# Patient Record
Sex: FEMALE | Race: WHITE | NOT HISPANIC OR LATINO | Employment: FULL TIME
[De-identification: names, ages, dates, MRNs, and addresses within clinical notes are randomized per-mention and may not be internally consistent; named-entity substitution may affect disease eponyms.]

---

## 2017-10-22 ENCOUNTER — HEALTH MAINTENANCE LETTER (OUTPATIENT)
Age: 42
End: 2017-10-22

## 2020-01-29 ENCOUNTER — HOSPITAL ENCOUNTER (EMERGENCY)
Facility: CLINIC | Age: 45
Discharge: HOME | End: 2020-01-29
Attending: FAMILY MEDICINE | Admitting: FAMILY MEDICINE
Payer: COMMERCIAL

## 2020-01-29 VITALS — RESPIRATION RATE: 17 BRPM | TEMPERATURE: 98.3 F | SYSTOLIC BLOOD PRESSURE: 119 MMHG | DIASTOLIC BLOOD PRESSURE: 79 MMHG

## 2020-01-29 DIAGNOSIS — M54.2 TRIGGER POINT WITH NECK PAIN: ICD-10-CM

## 2020-01-29 PROCEDURE — 99284 EMERGENCY DEPT VISIT MOD MDM: CPT | Mod: 25 | Performed by: FAMILY MEDICINE

## 2020-01-29 PROCEDURE — 99283 EMERGENCY DEPT VISIT LOW MDM: CPT | Mod: 25 | Performed by: FAMILY MEDICINE

## 2020-01-29 PROCEDURE — 20552 NJX 1/MLT TRIGGER POINT 1/2: CPT | Mod: Z6 | Performed by: FAMILY MEDICINE

## 2020-01-29 PROCEDURE — 20552 NJX 1/MLT TRIGGER POINT 1/2: CPT | Performed by: FAMILY MEDICINE

## 2020-01-29 RX ORDER — MELOXICAM 15 MG/1
7.5 TABLET ORAL DAILY
Qty: 5 TABLET | Refills: 0 | Status: SHIPPED | OUTPATIENT
Start: 2020-01-29 | End: 2020-01-29

## 2020-01-29 RX ORDER — BUPIVACAINE HYDROCHLORIDE 5 MG/ML
3 INJECTION, SOLUTION PERINEURAL ONCE
Status: COMPLETED | OUTPATIENT
Start: 2020-01-29 | End: 2020-01-29

## 2020-01-29 RX ORDER — MELOXICAM 15 MG/1
7.5 TABLET ORAL DAILY
Qty: 5 TABLET | Refills: 0 | Status: ON HOLD | OUTPATIENT
Start: 2020-01-29 | End: 2021-08-25

## 2020-01-29 RX ADMIN — BUPIVACAINE HYDROCHLORIDE 15 MG: 5 INJECTION, SOLUTION PERINEURAL at 03:23

## 2020-01-29 ASSESSMENT — ENCOUNTER SYMPTOMS
POLYDIPSIA: 0
PSYCHIATRIC NEGATIVE: 1
CARDIOVASCULAR NEGATIVE: 1
EYES NEGATIVE: 1
NUMBNESS: 0
MYALGIAS: 1
BACK PAIN: 1
WEAKNESS: 0
COUGH: 0
NECK PAIN: 1
CHILLS: 0
HEADACHES: 0
SHORTNESS OF BREATH: 0
FEVER: 0
GASTROINTESTINAL NEGATIVE: 1
POLYPHAGIA: 0

## 2020-01-29 NOTE — ED NOTES
No change in the pain since the trigger point injections.  Waiting for medications from pharmacy at this time for discharge.

## 2020-01-29 NOTE — ED AVS SNAPSHOT
[facility] Emergency Department  [address] [place] [address]  Phone:  [phone]  Fax:  [phone]                                    Lizz Polanco   MRN: [id number]    Department:  [facility] Emergency Department   Date of Visit:  1/29/2020           After Visit Summary Signature Page    I have received my discharge instructions, and my questions have been answered. I have discussed any challenges I see with this plan with the nurse or doctor.    ..........................................................................................................................................  Patient/Patient Representative Signature      ..........................................................................................................................................  Patient Representative Print Name and Relationship to Patient    ..................................................               ................................................  Date                                   Time    ..........................................................................................................................................  Reviewed by Signature/Title    ...................................................              ..............................................  Date                                               Time          22EPIC Rev 08/18

## 2020-01-29 NOTE — ED PROVIDER NOTES
History     Chief Complaint   Patient presents with     Neck Pain     HPI  Lizz Polanco is a 44 year old female who presents to the emergency room secondary concerns of neck pain to the right upper neck and posterior shoulder region.  States that the symptoms started about 10 days ago.  She is not aware of any specific injury.  She thinks that she might have a pinched nerve in her neck as a reason for the symptoms.  She denies any weakness in her upper extremities or lower extremities.  Denies any numbness or tingling except when pushing on a tender spot to the right shoulder area she states that she gets some tingling down the back of her upper arm to the right elbow.  Patient denies any fever or chills symptoms.  She denies any incontinence of bowel or bladder.  Patient states that she has an appointment with her clinic physician later this morning but just is unable to sleep and is looking for some control of the pain symptoms to allow her to get some sleep tonight before she can be seen in her clinic later today.  Patient drove herself to the ER.    Allergies:  No Known Allergies    Problem List:    There are no active problems to display for this patient.       Past Medical History:    No past medical history on file.    Past Surgical History:    Past Surgical History:   Procedure Laterality Date     BREAST SURGERY       ENT SURGERY         Family History:    No family history on file.    Social History:  Marital Status:   [2]  Social History     Tobacco Use     Smoking status: Former Smoker   Substance Use Topics     Alcohol use: No     Drug use: No        Medications:    meloxicam (MOBIC) 15 MG tablet  tiZANidine (ZANAFLEX) 4 MG tablet  Zolpidem Tartrate (AMBIEN PO)      Review of Systems   Constitutional: Negative for chills and fever.   HENT: Negative.    Eyes: Negative.    Respiratory: Negative for cough and shortness of breath.    Cardiovascular: Negative.    Gastrointestinal: Negative.     Endocrine: Negative for polydipsia, polyphagia and polyuria.   Genitourinary: Negative.    Musculoskeletal: Positive for back pain, myalgias and neck pain. Negative for gait problem.   Skin: Negative.  Negative for rash.   Neurological: Negative for weakness, numbness and headaches.   Psychiatric/Behavioral: Negative.    All other systems reviewed and are negative.      Physical Exam   BP: 119/79  Heart Rate: 96  Temp: 98.3  F (36.8  C)  Resp: 17      Physical Exam  Vitals signs and nursing note reviewed.   Constitutional:       General: She is in acute distress.      Appearance: Normal appearance. She is not ill-appearing, toxic-appearing or diaphoretic.   HENT:      Head: Normocephalic and atraumatic.      Nose: Nose normal.      Mouth/Throat:      Mouth: Mucous membranes are moist.   Eyes:      Extraocular Movements: Extraocular movements intact.      Conjunctiva/sclera: Conjunctivae normal.      Pupils: Pupils are equal, round, and reactive to light.   Neck:      Musculoskeletal: Muscular tenderness present. No injury or spinous process tenderness.        Comments: Compression test of the cervical spine did not elicit any radicular symptomatology.  No spinous process tenderness noted.  She does have the muscle tenderness as described above.  No acute neck injury identified.  Neurological:      Mental Status: She is alert.         ED Course        Procedures         Procedure: 3ml of 0.5% Marcaine was divided equally among the 3 trigger points to the right upper trapezius musculature.. Cleansing of the skin was performed with alcohol. Lizz was given informed consent as to the possible side effects of the injection to include: allergic reaction, infection, and possible puncture of lung. Lizz agrees to proceed with the trigger point injection(s). A timeout was observed prior to injecting the trigger points. The trigger points areas were injected without complication. Lizz was watched for signs of allergic  reaction and none were noted. Lizz was instructed to ice the trigger point areas and continue the gentle stretching exercises. She is encouraged to watch for evidence of infection at the trigger point injection site(s) and return to the ER or her clinic should infection be suspected.         Critical Care time:  none                 Medications   BUPivacaine (MARCAINE) 0.5% injection (15 mg Other Given by Other Clinician 1/29/20 0323)       Assessments & Plan (with Medical Decision Making)  44-year-old female to the ER secondary concerns of right-sided neck pain which he is concerned may be a pinched nerve in her neck.  She has appointment scheduled later today with her clinic physician in [place], [place].  Patient is desiring some help with the pain so she can get some sleep this morning.  The patient with exam findings consistent with myofascial tender points and trigger points in the right trapezius musculature.  Patient was offered trigger point injections and this was accepted.  Injections performed as per the procedure note above.  Patient tolerated well.  Patient instructed in the use of ice therapy followed by gentle stretching and/or massage therapy to the area.  Encourage follow-up with her clinic physician later today for recheck.  Additional medication prescribed as listed below.     I have reviewed the nursing notes.    I have reviewed the findings, diagnosis, plan and need for follow up with the patient.       Discharge Medication List as of 1/29/2020  3:07 AM      START taking these medications    Details   tiZANidine (ZANAFLEX) 4 MG tablet Take 1 tablet (4 mg) by mouth 3 times daily as needed for muscle spasms (MUSCLE SPASM), Disp-10 tablet, R-0, E-Prescribe                  I verbally discussed the findings of the evaluation today in the ER. I have verbally discussed with Lizz the suggested treatment(s) as described in the discharge instructions and handouts. I have prescribed the above  listed medications and instructed her on appropriate use of these medications.      I have verbally suggested she follow-up in her clinic or return to the ER for increased symptoms. See the follow-up recommendations documented  in the after visit summary in this visit's EPIC chart.    Final diagnoses:   Trigger point with neck pain - right trapezius x 3       1/29/2020   [facility] EMERGENCY DEPARTMENT     Saulo Greenfield,   01/29/20 0631

## 2020-01-29 NOTE — ED TRIAGE NOTES
Pt presents with concerns of right sided neck pain that started about 10 days ago.  Pt states pain is into her shoulder, denies tingling or numbness into the extremities. Pt states that she has had a pinched nerve in that location previous, has an appointment with Dave in the morning for this issue.  Tylenol last at 1900.  Pt tried Lidocaine patches.  Pt states that her goal for tonight's visit is something so she can sleep.

## 2020-01-29 NOTE — DISCHARGE INSTRUCTIONS
Please read and follow the handout(s) instructions. Return, if needed, for increased or worsening symptoms and as directed by the handout(s).    It is important for you to ice the area of pain/spasm. Use the ice for 10-15 minutes every 1-2 hours as needed for the pain. Gently stretch the area after the ice while the area is still cold (see the stretching handout). Swim or walk daily. This will help prevent the muscle from weakening which will eventually cause more risk of spasm/pain. Take your medications as directed only (see the med list). Return, if needed, for increased symptoms as directed your handout(s).     Saulo Greenfield DO

## 2020-02-24 ENCOUNTER — HEALTH MAINTENANCE LETTER (OUTPATIENT)
Age: 45
End: 2020-02-24

## 2020-12-13 ENCOUNTER — HEALTH MAINTENANCE LETTER (OUTPATIENT)
Age: 45
End: 2020-12-13

## 2021-02-21 ENCOUNTER — HEALTH MAINTENANCE LETTER (OUTPATIENT)
Age: 46
End: 2021-02-21

## 2021-04-17 ENCOUNTER — HEALTH MAINTENANCE LETTER (OUTPATIENT)
Age: 46
End: 2021-04-17

## 2021-08-25 ENCOUNTER — ANESTHESIA (OUTPATIENT)
Dept: EMERGENCY MEDICINE | Facility: CLINIC | Age: 46
End: 2021-08-25
Payer: COMMERCIAL

## 2021-08-25 ENCOUNTER — HOSPITAL ENCOUNTER (INPATIENT)
Facility: CLINIC | Age: 46
LOS: 4 days | Discharge: HOME | End: 2021-08-29
Attending: SURGERY | Admitting: SURGERY
Payer: COMMERCIAL

## 2021-08-25 ENCOUNTER — APPOINTMENT (OUTPATIENT)
Dept: GENERAL RADIOLOGY | Facility: CLINIC | Age: 46
End: 2021-08-25
Attending: EMERGENCY MEDICINE
Payer: COMMERCIAL

## 2021-08-25 ENCOUNTER — ANESTHESIA EVENT (OUTPATIENT)
Dept: EMERGENCY MEDICINE | Facility: CLINIC | Age: 46
End: 2021-08-25
Payer: COMMERCIAL

## 2021-08-25 ENCOUNTER — HOSPITAL ENCOUNTER (EMERGENCY)
Facility: CLINIC | Age: 46
Discharge: ANOTHER HOSPITAL | End: 2021-08-25
Attending: EMERGENCY MEDICINE | Admitting: EMERGENCY MEDICINE
Payer: COMMERCIAL

## 2021-08-25 ENCOUNTER — APPOINTMENT (OUTPATIENT)
Dept: GENERAL RADIOLOGY | Facility: CLINIC | Age: 46
End: 2021-08-25
Attending: PHYSICIAN ASSISTANT
Payer: COMMERCIAL

## 2021-08-25 VITALS
TEMPERATURE: 99.3 F | HEART RATE: 82 BPM | BODY MASS INDEX: 26.72 KG/M2 | SYSTOLIC BLOOD PRESSURE: 94 MMHG | RESPIRATION RATE: 24 BRPM | OXYGEN SATURATION: 98 % | DIASTOLIC BLOOD PRESSURE: 69 MMHG | WEIGHT: 127.87 LBS

## 2021-08-25 DIAGNOSIS — T50.902A OVERDOSE, INTENTIONAL SELF-HARM, INITIAL ENCOUNTER (H): ICD-10-CM

## 2021-08-25 DIAGNOSIS — R45.851 SUICIDAL IDEATION: ICD-10-CM

## 2021-08-25 DIAGNOSIS — T50.902A INTENTIONAL DRUG OVERDOSE, INITIAL ENCOUNTER (H): Primary | ICD-10-CM

## 2021-08-25 PROBLEM — T50.901A OVERDOSE: Status: ACTIVE | Noted: 2021-08-25

## 2021-08-25 LAB
ALBUMIN SERPL-MCNC: 3.2 G/DL (ref 3.4–5)
ALBUMIN SERPL-MCNC: 3.9 G/DL (ref 3.4–5)
ALBUMIN UR-MCNC: 100 MG/DL
ALP SERPL-CCNC: 55 U/L (ref 40–150)
ALP SERPL-CCNC: 62 U/L (ref 40–150)
ALT SERPL W P-5'-P-CCNC: 14 U/L (ref 0–50)
ALT SERPL W P-5'-P-CCNC: 14 U/L (ref 0–50)
AMPHETAMINES UR QL: NOT DETECTED
ANION GAP SERPL CALCULATED.3IONS-SCNC: 3 MMOL/L (ref 3–14)
ANION GAP SERPL CALCULATED.3IONS-SCNC: 5 MMOL/L (ref 3–14)
APAP SERPL-MCNC: <2 MG/L (ref 10–30)
APPEARANCE UR: ABNORMAL
AST SERPL W P-5'-P-CCNC: 14 U/L (ref 0–45)
AST SERPL W P-5'-P-CCNC: 16 U/L (ref 0–45)
BARBITURATES UR QL SCN: NOT DETECTED
BASE EXCESS BLDV CALC-SCNC: 3.4 MMOL/L (ref -7.7–1.9)
BASOPHILS # BLD AUTO: 0.1 10E3/UL (ref 0–0.2)
BASOPHILS NFR BLD AUTO: 1 %
BENZODIAZ UR QL SCN: DETECTED
BILIRUB SERPL-MCNC: 0.6 MG/DL (ref 0.2–1.3)
BILIRUB SERPL-MCNC: 0.6 MG/DL (ref 0.2–1.3)
BILIRUB UR QL STRIP: NEGATIVE
BUN SERPL-MCNC: 8 MG/DL (ref 7–30)
BUN SERPL-MCNC: 8 MG/DL (ref 7–30)
BUPRENORPHINE UR QL: NOT DETECTED
CA-I BLD-MCNC: 4.3 MG/DL (ref 4.4–5.2)
CALCIUM SERPL-MCNC: 7.6 MG/DL (ref 8.5–10.1)
CALCIUM SERPL-MCNC: 8.7 MG/DL (ref 8.5–10.1)
CANNABINOIDS UR QL: DETECTED
CHLORIDE BLD-SCNC: 103 MMOL/L (ref 94–109)
CHLORIDE BLD-SCNC: 113 MMOL/L (ref 94–109)
CO2 SERPL-SCNC: 26 MMOL/L (ref 20–32)
CO2 SERPL-SCNC: 28 MMOL/L (ref 20–32)
COCAINE UR QL SCN: NOT DETECTED
COLOR UR AUTO: YELLOW
CREAT SERPL-MCNC: 0.7 MG/DL (ref 0.52–1.04)
CREAT SERPL-MCNC: 0.72 MG/DL (ref 0.52–1.04)
D-METHAMPHET UR QL: NOT DETECTED
EOSINOPHIL # BLD AUTO: 0.1 10E3/UL (ref 0–0.7)
EOSINOPHIL NFR BLD AUTO: 1 %
ERYTHROCYTE [DISTWIDTH] IN BLOOD BY AUTOMATED COUNT: 11.8 % (ref 10–15)
ERYTHROCYTE [DISTWIDTH] IN BLOOD BY AUTOMATED COUNT: 11.9 % (ref 10–15)
ETHANOL SERPL-MCNC: <0.01 G/DL
GFR SERPL CREATININE-BSD FRML MDRD: >90 ML/MIN/1.73M2
GFR SERPL CREATININE-BSD FRML MDRD: >90 ML/MIN/1.73M2
GLUCOSE BLD-MCNC: 232 MG/DL (ref 70–99)
GLUCOSE BLD-MCNC: 80 MG/DL (ref 70–99)
GLUCOSE BLDC GLUCOMTR-MCNC: 108 MG/DL (ref 70–99)
GLUCOSE BLDC GLUCOMTR-MCNC: 84 MG/DL (ref 70–99)
GLUCOSE UR STRIP-MCNC: 50 MG/DL
HBA1C MFR BLD: 5.3 % (ref 0–5.6)
HCG UR QL: NEGATIVE
HCO3 BLDV-SCNC: 29 MMOL/L (ref 21–28)
HCT VFR BLD AUTO: 37.1 % (ref 35–47)
HCT VFR BLD AUTO: 39.3 % (ref 35–47)
HGB BLD-MCNC: 12.5 G/DL (ref 11.7–15.7)
HGB BLD-MCNC: 13.7 G/DL (ref 11.7–15.7)
HGB UR QL STRIP: NEGATIVE
HYALINE CASTS: 5 /LPF
IMM GRANULOCYTES # BLD: 0 10E3/UL
IMM GRANULOCYTES NFR BLD: 0 %
INR PPP: 1.08 (ref 0.85–1.15)
KETONES UR STRIP-MCNC: NEGATIVE MG/DL
LACTATE SERPL-SCNC: 1.1 MMOL/L (ref 0.7–2)
LACTATE SERPL-SCNC: 1.4 MMOL/L (ref 0.7–2)
LEUKOCYTE ESTERASE UR QL STRIP: NEGATIVE
LYMPHOCYTES # BLD AUTO: 1.1 10E3/UL (ref 0.8–5.3)
LYMPHOCYTES NFR BLD AUTO: 12 %
MAGNESIUM SERPL-MCNC: 2.1 MG/DL (ref 1.6–2.3)
MCH RBC QN AUTO: 31 PG (ref 26.5–33)
MCH RBC QN AUTO: 31.6 PG (ref 26.5–33)
MCHC RBC AUTO-ENTMCNC: 33.7 G/DL (ref 31.5–36.5)
MCHC RBC AUTO-ENTMCNC: 34.9 G/DL (ref 31.5–36.5)
MCV RBC AUTO: 91 FL (ref 78–100)
MCV RBC AUTO: 92 FL (ref 78–100)
METHADONE UR QL SCN: NOT DETECTED
MONOCYTES # BLD AUTO: 0.6 10E3/UL (ref 0–1.3)
MONOCYTES NFR BLD AUTO: 6 %
MUCOUS THREADS #/AREA URNS LPF: PRESENT /LPF
NEUTROPHILS # BLD AUTO: 7.3 10E3/UL (ref 1.6–8.3)
NEUTROPHILS NFR BLD AUTO: 80 %
NITRATE UR QL: NEGATIVE
NRBC # BLD AUTO: 0 10E3/UL
NRBC BLD AUTO-RTO: 0 /100
O2/TOTAL GAS SETTING VFR VENT: 21 %
OPIATES UR QL SCN: NOT DETECTED
OXYCODONE UR QL SCN: NOT DETECTED
PCO2 BLDV: 47 MM HG (ref 40–50)
PCP UR QL SCN: NOT DETECTED
PH BLDV: 7.4 [PH] (ref 7.32–7.43)
PH UR STRIP: 6 [PH] (ref 5–7)
PHOSPHATE SERPL-MCNC: 1.6 MG/DL (ref 2.5–4.5)
PLATELET # BLD AUTO: 226 10E3/UL (ref 150–450)
PLATELET # BLD AUTO: 286 10E3/UL (ref 150–450)
PO2 BLDV: 66 MM HG (ref 25–47)
POTASSIUM BLD-SCNC: 3 MMOL/L (ref 3.4–5.3)
POTASSIUM BLD-SCNC: 3.8 MMOL/L (ref 3.4–5.3)
PROPOXYPH UR QL: NOT DETECTED
PROT SERPL-MCNC: 5.8 G/DL (ref 6.8–8.8)
PROT SERPL-MCNC: 6.9 G/DL (ref 6.8–8.8)
RBC # BLD AUTO: 4.03 10E6/UL (ref 3.8–5.2)
RBC # BLD AUTO: 4.33 10E6/UL (ref 3.8–5.2)
RBC URINE: 5 /HPF
SALICYLATES SERPL-MCNC: 4 MG/DL
SARS-COV-2 RNA RESP QL NAA+PROBE: NEGATIVE
SODIUM SERPL-SCNC: 136 MMOL/L (ref 133–144)
SODIUM SERPL-SCNC: 142 MMOL/L (ref 133–144)
SP GR UR STRIP: 1.01 (ref 1–1.03)
SQUAMOUS EPITHELIAL: 11 /HPF
TRANSITIONAL EPI: 3 /HPF
TRICYCLICS UR QL SCN: NOT DETECTED
UROBILINOGEN UR STRIP-MCNC: NORMAL MG/DL
WBC # BLD AUTO: 11.7 10E3/UL (ref 4–11)
WBC # BLD AUTO: 9.2 10E3/UL (ref 4–11)
WBC URINE: 4 /HPF

## 2021-08-25 PROCEDURE — 250N000011 HC RX IP 250 OP 636: Performed by: PHYSICIAN ASSISTANT

## 2021-08-25 PROCEDURE — 250N000009 HC RX 250: Performed by: EMERGENCY MEDICINE

## 2021-08-25 PROCEDURE — 94002 VENT MGMT INPAT INIT DAY: CPT

## 2021-08-25 PROCEDURE — 83735 ASSAY OF MAGNESIUM: CPT | Performed by: PHYSICIAN ASSISTANT

## 2021-08-25 PROCEDURE — 370N000003 HC ANESTHESIA WARD SERVICE

## 2021-08-25 PROCEDURE — 87635 SARS-COV-2 COVID-19 AMP PRB: CPT | Performed by: EMERGENCY MEDICINE

## 2021-08-25 PROCEDURE — 82803 BLOOD GASES ANY COMBINATION: CPT | Performed by: EMERGENCY MEDICINE

## 2021-08-25 PROCEDURE — 36415 COLL VENOUS BLD VENIPUNCTURE: CPT | Performed by: PHYSICIAN ASSISTANT

## 2021-08-25 PROCEDURE — 99291 CRITICAL CARE FIRST HOUR: CPT | Performed by: PHYSICIAN ASSISTANT

## 2021-08-25 PROCEDURE — 250N000011 HC RX IP 250 OP 636: Performed by: NURSE PRACTITIONER

## 2021-08-25 PROCEDURE — 93005 ELECTROCARDIOGRAM TRACING: CPT

## 2021-08-25 PROCEDURE — 999N000065 XR CHEST PORT 1 VIEW

## 2021-08-25 PROCEDURE — 999N000157 HC STATISTIC RCP TIME EA 10 MIN

## 2021-08-25 PROCEDURE — 93010 ELECTROCARDIOGRAM REPORT: CPT | Performed by: EMERGENCY MEDICINE

## 2021-08-25 PROCEDURE — 80143 DRUG ASSAY ACETAMINOPHEN: CPT | Performed by: EMERGENCY MEDICINE

## 2021-08-25 PROCEDURE — 258N000003 HC RX IP 258 OP 636: Performed by: PHYSICIAN ASSISTANT

## 2021-08-25 PROCEDURE — 83036 HEMOGLOBIN GLYCOSYLATED A1C: CPT | Performed by: PHYSICIAN ASSISTANT

## 2021-08-25 PROCEDURE — 99291 CRITICAL CARE FIRST HOUR: CPT | Mod: 25 | Performed by: EMERGENCY MEDICINE

## 2021-08-25 PROCEDURE — 96368 THER/DIAG CONCURRENT INF: CPT | Performed by: EMERGENCY MEDICINE

## 2021-08-25 PROCEDURE — 80306 DRUG TEST PRSMV INSTRMNT: CPT | Performed by: EMERGENCY MEDICINE

## 2021-08-25 PROCEDURE — 82330 ASSAY OF CALCIUM: CPT | Performed by: PHYSICIAN ASSISTANT

## 2021-08-25 PROCEDURE — 83605 ASSAY OF LACTIC ACID: CPT | Performed by: EMERGENCY MEDICINE

## 2021-08-25 PROCEDURE — 96367 TX/PROPH/DG ADDL SEQ IV INF: CPT | Performed by: EMERGENCY MEDICINE

## 2021-08-25 PROCEDURE — 96366 THER/PROPH/DIAG IV INF ADDON: CPT | Performed by: EMERGENCY MEDICINE

## 2021-08-25 PROCEDURE — 96365 THER/PROPH/DIAG IV INF INIT: CPT | Performed by: EMERGENCY MEDICINE

## 2021-08-25 PROCEDURE — C9803 HOPD COVID-19 SPEC COLLECT: HCPCS | Performed by: EMERGENCY MEDICINE

## 2021-08-25 PROCEDURE — 5A1945Z RESPIRATORY VENTILATION, 24-96 CONSECUTIVE HOURS: ICD-10-PCS | Performed by: PHYSICIAN ASSISTANT

## 2021-08-25 PROCEDURE — 82247 BILIRUBIN TOTAL: CPT | Performed by: EMERGENCY MEDICINE

## 2021-08-25 PROCEDURE — 250N000011 HC RX IP 250 OP 636

## 2021-08-25 PROCEDURE — 250N000009 HC RX 250: Performed by: NURSE ANESTHETIST, CERTIFIED REGISTERED

## 2021-08-25 PROCEDURE — 85610 PROTHROMBIN TIME: CPT | Performed by: EMERGENCY MEDICINE

## 2021-08-25 PROCEDURE — 84450 TRANSFERASE (AST) (SGOT): CPT | Performed by: PHYSICIAN ASSISTANT

## 2021-08-25 PROCEDURE — 93005 ELECTROCARDIOGRAM TRACING: CPT | Performed by: EMERGENCY MEDICINE

## 2021-08-25 PROCEDURE — 250N000009 HC RX 250: Performed by: PHYSICIAN ASSISTANT

## 2021-08-25 PROCEDURE — 85027 COMPLETE CBC AUTOMATED: CPT | Performed by: PHYSICIAN ASSISTANT

## 2021-08-25 PROCEDURE — 258N000003 HC RX IP 258 OP 636: Performed by: EMERGENCY MEDICINE

## 2021-08-25 PROCEDURE — 80179 DRUG ASSAY SALICYLATE: CPT | Performed by: EMERGENCY MEDICINE

## 2021-08-25 PROCEDURE — 200N000002 HC R&B ICU UMMC

## 2021-08-25 PROCEDURE — 31500 INSERT EMERGENCY AIRWAY: CPT | Performed by: EMERGENCY MEDICINE

## 2021-08-25 PROCEDURE — 93010 ELECTROCARDIOGRAM REPORT: CPT | Performed by: INTERNAL MEDICINE

## 2021-08-25 PROCEDURE — 85025 COMPLETE CBC W/AUTO DIFF WBC: CPT | Performed by: EMERGENCY MEDICINE

## 2021-08-25 PROCEDURE — 83605 ASSAY OF LACTIC ACID: CPT | Performed by: PHYSICIAN ASSISTANT

## 2021-08-25 PROCEDURE — 82077 ASSAY SPEC XCP UR&BREATH IA: CPT | Performed by: EMERGENCY MEDICINE

## 2021-08-25 PROCEDURE — 99292 CRITICAL CARE ADDL 30 MIN: CPT | Performed by: EMERGENCY MEDICINE

## 2021-08-25 PROCEDURE — 36415 COLL VENOUS BLD VENIPUNCTURE: CPT | Performed by: EMERGENCY MEDICINE

## 2021-08-25 PROCEDURE — 999N000065 XR ABDOMEN PORT 1 VIEWS

## 2021-08-25 PROCEDURE — 84075 ASSAY ALKALINE PHOSPHATASE: CPT | Performed by: PHYSICIAN ASSISTANT

## 2021-08-25 PROCEDURE — 74018 RADEX ABDOMEN 1 VIEW: CPT | Mod: 26 | Performed by: RADIOLOGY

## 2021-08-25 PROCEDURE — 71045 X-RAY EXAM CHEST 1 VIEW: CPT | Mod: 26 | Performed by: RADIOLOGY

## 2021-08-25 PROCEDURE — 71045 X-RAY EXAM CHEST 1 VIEW: CPT

## 2021-08-25 PROCEDURE — 51702 INSERT TEMP BLADDER CATH: CPT | Performed by: EMERGENCY MEDICINE

## 2021-08-25 PROCEDURE — 96375 TX/PRO/DX INJ NEW DRUG ADDON: CPT | Performed by: EMERGENCY MEDICINE

## 2021-08-25 PROCEDURE — 81001 URINALYSIS AUTO W/SCOPE: CPT | Performed by: EMERGENCY MEDICINE

## 2021-08-25 PROCEDURE — 250N000013 HC RX MED GY IP 250 OP 250 PS 637: Performed by: PHYSICIAN ASSISTANT

## 2021-08-25 PROCEDURE — 81025 URINE PREGNANCY TEST: CPT | Performed by: EMERGENCY MEDICINE

## 2021-08-25 PROCEDURE — 250N000011 HC RX IP 250 OP 636: Performed by: EMERGENCY MEDICINE

## 2021-08-25 PROCEDURE — 84155 ASSAY OF PROTEIN SERUM: CPT | Performed by: PHYSICIAN ASSISTANT

## 2021-08-25 PROCEDURE — 999N000158 HC STATISTIC RCP TIME ED VENT EA 10 MIN

## 2021-08-25 PROCEDURE — 84100 ASSAY OF PHOSPHORUS: CPT | Performed by: PHYSICIAN ASSISTANT

## 2021-08-25 RX ORDER — PROPOFOL 10 MG/ML
10-20 INJECTION, EMULSION INTRAVENOUS EVERY 30 MIN PRN
Status: DISCONTINUED | OUTPATIENT
Start: 2021-08-25 | End: 2021-08-27

## 2021-08-25 RX ORDER — ALPRAZOLAM 0.25 MG
0.25 TABLET ORAL 3 TIMES DAILY PRN
Status: ON HOLD | COMMUNITY
End: 2021-08-29

## 2021-08-25 RX ORDER — NICOTINE POLACRILEX 4 MG
15-30 LOZENGE BUCCAL
Status: DISCONTINUED | OUTPATIENT
Start: 2021-08-25 | End: 2021-08-29 | Stop reason: HOSPADM

## 2021-08-25 RX ORDER — IBUPROFEN 200 MG
200-400 TABLET ORAL EVERY 8 HOURS PRN
COMMUNITY

## 2021-08-25 RX ORDER — ZOLPIDEM TARTRATE 10 MG/1
10 TABLET ORAL
Status: ON HOLD | COMMUNITY
Start: 2021-08-16 | End: 2021-08-29

## 2021-08-25 RX ORDER — PROPOFOL 10 MG/ML
100 INJECTION, EMULSION INTRAVENOUS CONTINUOUS
Status: DISCONTINUED | OUTPATIENT
Start: 2021-08-25 | End: 2021-08-25 | Stop reason: HOSPADM

## 2021-08-25 RX ORDER — CALCIUM GLUCONATE 20 MG/ML
2 INJECTION, SOLUTION INTRAVENOUS ONCE
Status: COMPLETED | OUTPATIENT
Start: 2021-08-25 | End: 2021-08-25

## 2021-08-25 RX ORDER — ESCITALOPRAM OXALATE 5 MG/1
5 TABLET ORAL DAILY
Status: ON HOLD | COMMUNITY
End: 2021-08-29

## 2021-08-25 RX ORDER — DEXTROSE MONOHYDRATE 25 G/50ML
25-50 INJECTION, SOLUTION INTRAVENOUS
Status: DISCONTINUED | OUTPATIENT
Start: 2021-08-25 | End: 2021-08-29 | Stop reason: HOSPADM

## 2021-08-25 RX ORDER — ROPIVACAINE IN 0.9% SOD CHL/PF 0.1 %
.03-.125 PLASTIC BAG, INJECTION (ML) EPIDURAL CONTINUOUS
Status: DISCONTINUED | OUTPATIENT
Start: 2021-08-25 | End: 2021-08-25 | Stop reason: HOSPADM

## 2021-08-25 RX ORDER — ETOMIDATE 2 MG/ML
20 INJECTION INTRAVENOUS ONCE
Status: COMPLETED | OUTPATIENT
Start: 2021-08-25 | End: 2021-08-25

## 2021-08-25 RX ORDER — ROCURONIUM BROMIDE 50 MG/5 ML
50 SYRINGE (ML) INTRAVENOUS ONCE
Status: COMPLETED | OUTPATIENT
Start: 2021-08-25 | End: 2021-08-25

## 2021-08-25 RX ORDER — OXYBUTYNIN CHLORIDE 10 MG/1
10 TABLET, EXTENDED RELEASE ORAL DAILY
COMMUNITY

## 2021-08-25 RX ORDER — PROPOFOL 10 MG/ML
5-75 INJECTION, EMULSION INTRAVENOUS CONTINUOUS
Status: DISCONTINUED | OUTPATIENT
Start: 2021-08-25 | End: 2021-08-27

## 2021-08-25 RX ORDER — PROPOFOL 10 MG/ML
10-20 INJECTION, EMULSION INTRAVENOUS EVERY 30 MIN PRN
Status: DISCONTINUED | OUTPATIENT
Start: 2021-08-25 | End: 2021-08-25

## 2021-08-25 RX ORDER — SODIUM CHLORIDE 9 MG/ML
INJECTION, SOLUTION INTRAVENOUS CONTINUOUS
Status: DISCONTINUED | OUTPATIENT
Start: 2021-08-25 | End: 2021-08-25 | Stop reason: HOSPADM

## 2021-08-25 RX ORDER — MIDAZOLAM HYDROCHLORIDE 5 MG/ML
5 INJECTION, SOLUTION INTRAMUSCULAR; INTRAVENOUS ONCE
Status: COMPLETED | OUTPATIENT
Start: 2021-08-25 | End: 2021-08-25

## 2021-08-25 RX ORDER — SODIUM CHLORIDE, SODIUM LACTATE, POTASSIUM CHLORIDE, CALCIUM CHLORIDE 600; 310; 30; 20 MG/100ML; MG/100ML; MG/100ML; MG/100ML
INJECTION, SOLUTION INTRAVENOUS CONTINUOUS
Status: DISCONTINUED | OUTPATIENT
Start: 2021-08-25 | End: 2021-08-27

## 2021-08-25 RX ORDER — PANTOPRAZOLE SODIUM 40 MG/1
40 TABLET, DELAYED RELEASE ORAL
Status: DISCONTINUED | OUTPATIENT
Start: 2021-08-26 | End: 2021-08-26

## 2021-08-25 RX ORDER — PANTOPRAZOLE SODIUM 40 MG/1
40 TABLET, DELAYED RELEASE ORAL
Status: DISCONTINUED | OUTPATIENT
Start: 2021-08-26 | End: 2021-08-25 | Stop reason: HOSPADM

## 2021-08-25 RX ORDER — PROPOFOL 10 MG/ML
5-75 INJECTION, EMULSION INTRAVENOUS CONTINUOUS
Status: DISCONTINUED | OUTPATIENT
Start: 2021-08-25 | End: 2021-08-25

## 2021-08-25 RX ORDER — PROPOFOL 10 MG/ML
5-75 INJECTION, EMULSION INTRAVENOUS CONTINUOUS
Status: DISCONTINUED | OUTPATIENT
Start: 2021-08-25 | End: 2021-08-25 | Stop reason: HOSPADM

## 2021-08-25 RX ORDER — LORAZEPAM 2 MG/ML
2 INJECTION INTRAMUSCULAR
Status: DISCONTINUED | OUTPATIENT
Start: 2021-08-25 | End: 2021-08-27

## 2021-08-25 RX ORDER — ALBUTEROL SULFATE 90 UG/1
6 AEROSOL, METERED RESPIRATORY (INHALATION) EVERY 4 HOURS PRN
Status: DISCONTINUED | OUTPATIENT
Start: 2021-08-25 | End: 2021-08-29 | Stop reason: HOSPADM

## 2021-08-25 RX ORDER — MIDAZOLAM HYDROCHLORIDE 5 MG/ML
INJECTION, SOLUTION INTRAMUSCULAR; INTRAVENOUS
Status: COMPLETED
Start: 2021-08-25 | End: 2021-08-25

## 2021-08-25 RX ORDER — POTASSIUM CHLORIDE 7.45 MG/ML
10 INJECTION INTRAVENOUS
Status: COMPLETED | OUTPATIENT
Start: 2021-08-25 | End: 2021-08-26

## 2021-08-25 RX ORDER — BUPROPION HYDROCHLORIDE 150 MG/1
150 TABLET ORAL DAILY
COMMUNITY

## 2021-08-25 RX ADMIN — SODIUM CHLORIDE, POTASSIUM CHLORIDE, SODIUM LACTATE AND CALCIUM CHLORIDE: 600; 310; 30; 20 INJECTION, SOLUTION INTRAVENOUS at 16:05

## 2021-08-25 RX ADMIN — ETOMIDATE 20 MG: 40 INJECTION, SOLUTION INTRAVENOUS at 09:13

## 2021-08-25 RX ADMIN — ROCURONIUM BROMIDE 50 MG: 10 INJECTION INTRAVENOUS at 09:28

## 2021-08-25 RX ADMIN — POTASSIUM CHLORIDE 10 MEQ: 7.46 INJECTION, SOLUTION INTRAVENOUS at 23:50

## 2021-08-25 RX ADMIN — POTASSIUM CHLORIDE 10 MEQ: 7.46 INJECTION, SOLUTION INTRAVENOUS at 21:37

## 2021-08-25 RX ADMIN — PROPOFOL 5 MCG/KG/MIN: 10 INJECTION, EMULSION INTRAVENOUS at 09:53

## 2021-08-25 RX ADMIN — SUCCINYLCHOLINE CHLORIDE 140 MG: 20 INJECTION, SOLUTION INTRAMUSCULAR; INTRAVENOUS at 09:13

## 2021-08-25 RX ADMIN — PROPOFOL 45 MCG/KG/MIN: 10 INJECTION, EMULSION INTRAVENOUS at 13:48

## 2021-08-25 RX ADMIN — ENOXAPARIN SODIUM 30 MG: 30 INJECTION SUBCUTANEOUS at 19:47

## 2021-08-25 RX ADMIN — Medication 50 MG: at 09:27

## 2021-08-25 RX ADMIN — PROPOFOL 75 MCG/KG/MIN: 10 INJECTION, EMULSION INTRAVENOUS at 22:51

## 2021-08-25 RX ADMIN — PROPOFOL 20 MG: 10 INJECTION, EMULSION INTRAVENOUS at 20:02

## 2021-08-25 RX ADMIN — PROPOFOL 100 MG: 10 INJECTION, EMULSION INTRAVENOUS at 13:46

## 2021-08-25 RX ADMIN — LORAZEPAM 2 MG: 2 INJECTION INTRAMUSCULAR; INTRAVENOUS at 19:47

## 2021-08-25 RX ADMIN — POTASSIUM PHOSPHATE, MONOBASIC AND POTASSIUM PHOSPHATE, DIBASIC 15 MMOL: 224; 236 INJECTION, SOLUTION INTRAVENOUS at 20:52

## 2021-08-25 RX ADMIN — SODIUM CHLORIDE 1000 ML: 9 INJECTION, SOLUTION INTRAVENOUS at 11:02

## 2021-08-25 RX ADMIN — MIDAZOLAM 5 MG: 5 INJECTION INTRAMUSCULAR; INTRAVENOUS at 12:10

## 2021-08-25 RX ADMIN — NOREPINEPHRINE BITARTRATE 4 MG/250 ML (16 MCG/ML) IN 0.9 % NACL IV 0.05 MCG/KG/MIN: at 10:59

## 2021-08-25 RX ADMIN — POTASSIUM CHLORIDE 10 MEQ: 7.46 INJECTION, SOLUTION INTRAVENOUS at 22:46

## 2021-08-25 RX ADMIN — CALCIUM GLUCONATE 2 G: 20 INJECTION, SOLUTION INTRAVENOUS at 17:51

## 2021-08-25 RX ADMIN — MIDAZOLAM HYDROCHLORIDE 5 MG: 5 INJECTION, SOLUTION INTRAMUSCULAR; INTRAVENOUS at 12:10

## 2021-08-25 RX ADMIN — PROPOFOL 20 MG: 10 INJECTION, EMULSION INTRAVENOUS at 21:59

## 2021-08-25 RX ADMIN — PROPOFOL 60 MCG/KG/MIN: 10 INJECTION, EMULSION INTRAVENOUS at 16:09

## 2021-08-25 RX ADMIN — NICOTINE 7 MG/24 HR DAILY TRANSDERMAL PATCH 1 PATCH: at 20:55

## 2021-08-25 RX ADMIN — TAZOBACTAM SODIUM AND PIPERACILLIN SODIUM 4.5 G: 500; 4 INJECTION, SOLUTION INTRAVENOUS at 09:59

## 2021-08-25 RX ADMIN — LORAZEPAM 2 MG: 2 INJECTION INTRAMUSCULAR; INTRAVENOUS at 22:02

## 2021-08-25 RX ADMIN — PROPOFOL 100 MG: 10 INJECTION, EMULSION INTRAVENOUS at 12:20

## 2021-08-25 RX ADMIN — MIDAZOLAM HYDROCHLORIDE 2 MG: 1 INJECTION, SOLUTION INTRAMUSCULAR; INTRAVENOUS at 20:09

## 2021-08-25 RX ADMIN — POTASSIUM CHLORIDE 10 MEQ: 7.46 INJECTION, SOLUTION INTRAVENOUS at 18:41

## 2021-08-25 ASSESSMENT — MIFFLIN-ST. JEOR: SCORE: 1060.75

## 2021-08-25 NOTE — ED NOTES
Pt became restless and agitated around 1207-attempted to pull tubes. Dr. Razo informed and additional sedation given. 5mg of versed and 100 mg propofol.   Rt called down to suction. Sedation medication effective. propofol increased to 45 mcg/kg/hr. Poison control updated on pt's status and advised to continue with sedative care at this time. Stable on ventilator. EMS in route at this time to Transfer to [facility].

## 2021-08-25 NOTE — ED NOTES
ED Timeline Record.  0904 POC Glucose= 201  0909 Labs drawn and COVID-19 obtained.   0909 NS at 250cc/hr L) hand IV  0913 Etomidate 20mg IV, Succ 140mg  0922 ET in stomach; gastric contents returned, suctioned.  0924 ET Tube removed  0927 Rocuronium 50mg  0928 ET tube 7.5 21cm at lip  0929 Franco OTT CRNA here.   Gali Valencia RN
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
## 2021-08-25 NOTE — ANESTHESIA PREPROCEDURE EVALUATION
Anesthesia Pre-Procedure Evaluation    Patient: Lizz Polanco   MRN: [id number] : 1975        Preoperative Diagnosis: * No surgery found *   Procedure :      No past medical history on file.   Past Surgical History:   Procedure Laterality Date     BREAST SURGERY       ENT SURGERY        No Known Allergies   Social History     Tobacco Use     Smoking status: Former Smoker   Substance Use Topics     Alcohol use: No      Wt Readings from Last 1 Encounters:   21 58 kg (127 lb 13.9 oz)              OUTSIDE LABS:  CBC:   Lab Results   Component Value Date    WBC 9.2 2021    WBC 8.8 2012    HGB 13.7 2021    HGB 12.5 2012    HCT 39.3 2021    HCT 36.6 2012     2021     2012     BMP:   Lab Results   Component Value Date     2021     2012    POTASSIUM 3.8 2021    POTASSIUM 3.7 2012    CHLORIDE 103 2021    CHLORIDE 103 2012    CO2 22 2012    BUN 13 2012    CR 0.58 2012     (H) 2012     COAGS: No results found for: PTT, INR, FIBR  POC: No results found for: BGM, HCG, HCGS  HEPATIC: No results found for: ALBUMIN, PROTTOTAL, ALT, AST, GGT, ALKPHOS, BILITOTAL, BILIDIRECT, ANDREW  OTHER:   Lab Results   Component Value Date    LACT 1.4 2021    KILEY 8.9 2012       Anesthesia Plan    ASA Status:  3, emergent      - Procedure: Procedure only, no anesthetic delivered   NPO Status:  ELEVATED Aspiration Risk/Unknown                  Consents    Anesthesia Plan(s) and associated risks, benefits, and realistic alternatives discussed. Questions answered and patient/representative(s) expressed understanding.     - Discussed with:  Implied consent/emergency         Postoperative Care            Comments:    Called to ED for airway support/ overdose.  Arrived to ED and ED physician had established an OETT but gastric content coming out of the OETT, sats currently % .  OETT  pulled and oral pharynx suctioned, RT masking pt, pt clinching jaw currently but not responsive or moving any extremities.  Sux was given earlier, Zemuron given now per record and intubated X 1 per chart and pharynx clear.  Capnograph device used with + color change, RT here bagging pt, tube secured at 21 cms, sats remain stable and never dropped below 95-96%, CXR ordered per ED physician.            PRAVIN Stock CRNA
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
## 2021-08-25 NOTE — ANESTHESIA PROCEDURE NOTES
Airway       Patient location during procedure: OR       Procedure Start/Stop Times: 8/25/2021 9:24 AM and 8/25/2021 9:33 AM  Staff -        CRNA: Saulo Ruvalcaba APRN CRNA       Performed By: CRNA  Consent for Airway        Urgency: elective  Indications and Patient Condition       Indications for airway management: airway protection, altered level of consciousness and respiratory insufficiency       Induction type:RSI       Mask difficulty assessment: 1 - vent by mask    Final Airway Details       Final airway type: endotracheal airway       Successful airway: ETT - single  Endotracheal Airway Details        ETT size (mm): 7.5       Cuffed: yes       Cuff volume (mL): 10       Successful intubation technique: direct laryngoscopy and video laryngoscopy       VL Blade Size: Glidescope 4       Grade View of Cords: 2       Adjucts: stylet       Position: Center       Measured from: lips       Secured at (cm): 21       Bite block used: Oral Airway    Post intubation assessment        Placement verified by: capnometry, equal breath sounds, chest rise and x-ray        Number of attempts at approach: 1       Number of other approaches attempted: 0       Secured with: commercial tube arthur       Ease of procedure: easy       Dentition: Intact and Unchanged

## 2021-08-25 NOTE — ED NOTES
Propofol increased as pt noted to be moving upper extremities.VSS-awaiting transfer to [facility]

## 2021-08-25 NOTE — ED NOTES
Report called to Stacie CARPIO at [facility]. Currently remains sedated awaiting transfer.

## 2021-08-25 NOTE — ED NOTES
Became restless prior to transport - additional bolus of 100 mg propofol given. Proceeded to sedation-proceeding with transfer-RT here to assist with vent transfer.

## 2021-08-25 NOTE — ANESTHESIA CARE TRANSFER NOTE
Patient: Lizz Polanco    * No procedures listed *    Diagnosis: * No pre-op diagnosis entered *  Diagnosis Additional Information: No value filed.    Anesthesia Type:   No value filed.     Note:    Oropharynx: ventilatory support  Level of Consciousness: unresponsive  Patient oxygen source: vented.    Independent Airway: airway patency not satisfactory and stable  Dentition: dentition unchanged    Report to RN Given: handoff report given  Patient transferred to: Emergency Department    Handoff Report: Identifed the Patient, Identified the Reponsible Provider, Reviewed the pertinent medical history, Discussed the surgical course, Reviewed Intra-OP anesthesia mangement and issues during anesthesia, Set expectations for post-procedure period and Allowed opportunity for questions and acknowledgement of understanding      Vitals:  Vitals Value Taken Time   /79 08/25/21 1030   Temp     Pulse 69 08/25/21 1032   Resp 12 08/25/21 1032   SpO2 95 % 08/25/21 1032   Vitals shown include unvalidated device data.    Electronically Signed By: PRAVIN Stock CRNA  August 25, 2021  10:33 AM

## 2021-08-25 NOTE — H&P
[facility]    ICU History and Physical    Primary Team: Critical Care Team, 10A ICU  Reason for Critical Care Admission: Poly drug overdose  Admitting Physician:   Date of Admission:  8/25/2021        Assessment: Critical Care      Lizz Polanco is a 46 year old female admitted to ICU on 8/25/2021. She presented unresponsive to OSH by ambulance after apparent intentional drug overdose with multiple meds.  Recent history of depression and suicidal thought validated by boyfriend and friend.  Apparently she took multiple meds in a suicide attempt in the morning on day of admission and apologized to her boyfriend after taking the meds.  Medications bottles were empty, Ambien 10 mg 90 tablets (refilled 8/16),  Wellbutrin  mg 30 tablet (refill 8/17), Xanax 0.25 mg 60 tablets (8/7) and Zanaflex 4 mg 90 tablets (filled July).  Recent history of ibuprofen use for ongoing pain.        Plan: Critical Care      Neuro/ pain/ sedation:  # Poly drug overdose  # Acute CNS depression  # Encephalopathy  Concern for patient taking an unknown amount of Ambien 10 mg 90 tablets (refilled 8/16),  Wellbutrin  mg 30 tablet (refill 8/17), Xanax 0.25 mg 60 tablets (8/7) and Zanaflex 4 mg 90 tablets (filled July).  Recent history of ibuprofen use for ongoing pain.  Per boyfriend, patient's anxiety and suicidal ideation has been increasing over the past few weeks; her coping mechanism tends to be self harm.  Boyfriend states that he is unaware of suicidal ideation beyond the previous two weeks.   OSH labs included acetaminophen level, ethanol, and salicylate level, lactic acid; all negative.  Urine drug screen panel (OSH) positive for cannabinoids and benzodiazepines.  Per chart, patient received propofol 100mg x 2 boluses + gtt and versed bolus for agitation in the ED.      - propofol gtt  - Monitor neurological status to include risk for siezures. Notify provider for any acute  "changes in exam  - Psychiatry consult when appropriate given recent hx of suspected suicidal attempt with drug overdose  Consulted with poison control, appreciate the recommendations.    -  Recommendations: Wellbutrin XR is the most concerning medication ingested considering high risk of seizures, tachycardia, arhythmia, QRS widening.  Effects of Wellbutrin XR can last for 18-24 hours.     -Treat seizures with ativan. Consider versed if severe   - treat QRS >120 with bicarb bolus + bicarb gtt.  If QTc >500 treat with Mg bolus.  Maintain high replacement protocols for potassium and magnesium.     -Daily EKGs.  No fentanyl use as this is contraindicated.   - Use caution in treating hypertension as Wellbutrin XR can often cause hypotension later in metabolism.    # Tobacco use  Per boyfriend, Hank, patient smokes 1 pack per day, unknown length of history  - Nicoderm patch starting tomorrow morning    # Alcohol abuse disorder  Per patient's boyfriend, she does not consistently consume alcohol; however, she will \"binge on 2-3 drinks on occasion when stressed\"  - Ativan prn for anxiety  - no indication for CIWA protocol at this time given PMH     Pulmonary:  # Acute respiratory hypoxia  # Concern for airway compromise, s/p mechanical ventilation  At ED OSH the ETT entered the stomach during initial intubation, gastric content was present and suctioned. Tube was removed, rocuronium administered, second attempt successful  Ventilation Mode: CMV/AC  (Continuous Mandatory Ventilation/ Assist Control)  FiO2 (%): 60 %  Rate Set (breaths/minute): 14 breaths/min  Tidal Volume Set (mL): 400 mL  PEEP (cm H2O): 5 cmH2O  Oxygen Concentration (%): 60 %  Resp: (!) 6  - VBG  - Triglyceride lab  - supplemental oxygen to keep saturation about 92%  - Wean vent as tolerated     Cardiovascular:  # Acute hypotension  Unknown source of hypotension at the moment; however, suspect secondary to propofol 100 mg bolus x 2 with infusion on the ED.  "   - Norepinephrine gtt, wean as tolerated to maintain MAP >65, systolic >90  - Repeat EKG   - Daily ECGs  - Consider sodium bicarb if QRS >120  - Atropine for bradycardia  - Monitor hemodynamic status.  - Ionized calcium  - Lactic acid, will trend if elevated     GI/Nutrition:  No active issues  - Diet:  NPO  - OG LIS     Renal/ Fluid Balance:   # Primary metabolic alkalosis with secondary respiratory acidosis  UA from OSH with protein and RBC.  - Will monitor intake and output   - High replacement protocol for Potassium; K+ >4  - High replacement protocol for Magnesium; Mg >2  - Daily BMP and Mg  - CK, will trend if elevated  - LR @ 75mL/hr for IV fluid hydration       Endocrine:  No active concerns  - Monitoring for hypoglycemia per protocol     ID:  # Concern for aspiration in the setting of esophageal intubation    - Will continue to monitor respiratory status, Tmax, and leukocytosis     Hematology:  No acute concerns  - Daily CBC     MSK:   - PT and OT consulted. Appreciate recommendations.     Lines/ tubes/ drains: ETT @ 21, OG @ 50 cm, R arm PIV, L arm PIV  Louise Catheter: PRESENT for strict I/O  Central Lines: None     Prophylaxis:  - DVT Prophylaxis: Pneumatic Compression Devices  - PUD Prophylaxis: PPI     Next of Kin: Patient's mother lives in [place] but is unable to visit as result of the  having COVID-19.  Patient has an adult child with developmental delays and 2 teenaged children who live with her ex-.      Code Status:  Full Code     Disposition:  - ICU, critical condition     The patient's care was discussed with the Attending Physician, Joni George.     Lizz was seen and evaluated by me on 8/25/2021.  She was in critical condition as result of suspected poly drug overdose.  Her condition is critical     Total time spent by me, excluding procedures, was 60 minutes     Elie Teague PA-C  [facility]  Securely message with the Vocera Web  Console (learn more here)  Text page via [facility] Paging/Directory     ______________________________________________________________________        Chief Complaint      Suspected poly drug overdose     Unable to obtain a history from the patient due to critical condition and sedation.  Will attempt to obtain PMH through chart review and patient's emergency contact        History of Present Illness     Lizz Polanco is a 46 year old female admitted to ICU on 8/25/2021. She presented unresponsive to OSH by ambulance after apparent intentional drug overdose with multiple meds.  Recent history of depression and suicidal thought validated by boyfriend and friend.  Apparently she took multiple meds in a suicide attempt in the morning on day of admission and apologized to her boyfriend after taking the meds.  Medications bottles were empty, Ambien 10 mg 90 tablets (refilled 8/16),  Wellbutrin  mg 30 tablet (refill 8/17), Xanax 0.25 mg 60 tablets (8/7) and Zanaflex 4 mg 90 tablets (filled July).  Recent history of ibuprofen use for ongoing pain.  Now admitted to the ICU for stabilization       Review of Systems    Review of systems not obtained due to patient factors - sedation    Past Medical History    I have reviewed this patient's medical history and updated it with pertinent information if needed.   No past medical history on file.    Past Surgical History   I have reviewed this patient's surgical history and updated it with pertinent information if needed.  Past Surgical History:   Procedure Laterality Date     BREAST SURGERY       ENT SURGERY         Social History   I have reviewed this patient's social history and updated it with pertinent information if needed.  Social History     Tobacco Use     Smoking status: Former Smoker   Substance Use Topics     Alcohol use: No     Drug use: No       Family History   No significant family history, including no history of: psychiatric disorders, HTN, heart failure,  pulmonary disease, DM, coagulopathies, or cancer.    Prior to Admission Medications   Prior to Admission Medications   Prescriptions Last Dose Informant Patient Reported? Taking?   meloxicam (MOBIC) 15 MG tablet   No No   Sig: Take 0.5 tablets (7.5 mg) by mouth daily TAKE WITH FOOD AS NEEDED FOR PAIN. WEAN OFF OF THE MEDICATIONS AS YOUR SYMPTOMS IMPROVE.   zolpidem (AMBIEN) 10 MG tablet   Yes No   Sig: Take 10 mg by mouth nightly as needed for sleep      Facility-Administered Medications: None     Allergies   No Known Allergies    Physical Exam   Vital Signs: Temp: 98  F (36.7  C) Temp src: Axillary BP: 119/81 Pulse: 80   Resp: (!) 6 SpO2: 100 % O2 Device: Mechanical Ventilator    Weight: 117 lbs 8.08 oz    General: sedated  HEENT: normocephalic, atraumatic.  PERRL but sliggish, anicteric sclera, ETT and OG in place  Neuro: RASS -2, able to follow some commands, no evidence of focal deficit  CV: RRR, no M/G/R  Pulm: Course breath sounds all fields bilaterally, otherwise no wheezes or rhonchi  Abd: normoactive breath sounds, soft, nondistended  : Louise catheter in place, urine yellow and clear  Extremities: Able to move all extremities, warm, well perfused   Skin: No evidences of abrasions or bruising  Psych: unable to evaluate due to sedation    Data   I reviewed all medications, new labs and imaging results over the last 24 hours.  No lab results found in last 7 days.    Complete Blood Count   Recent Labs   Lab 08/25/21 0919   WBC 9.2   HGB 13.7          Basic Metabolic Panel  Recent Labs   Lab 08/25/21  1557 08/25/21 0919   NA  --  136   POTASSIUM  --  3.8   CHLORIDE  --  103   CO2  --  28   BUN  --  8   CR  --  0.70   GLC 84 232*       Liver Function Tests  Recent Labs   Lab 08/25/21  0934 08/25/21  0919   AST  --  14   ALT  --  14   ALKPHOS  --  62   BILITOTAL  --  0.6   ALBUMIN  --  3.9   INR 1.08  --        Pancreatic Enzymes  No lab results found in last 7 days.    Coagulation Profile  Recent Labs    Lab 08/25/21  0934   INR 1.08       IMAGING:  Recent Results (from the past 24 hour(s))   XR Chest Port 1 View    Narrative    CHEST ONE VIEW PORTABLE   8/25/2021 9:55 AM     HISTORY:  Post intubation/OG placement.    COMPARISON: 12/19/2012      Impression    IMPRESSION: The tip of the endotracheal tube is 4.0 cm above the  christie. Nasogastric tube extends below the left hemidiaphragm. No  airspace consolidation, pneumothorax, or pleural effusion.    GOLDY PINEDA MD         SYSTEM ID:  [id number]

## 2021-08-25 NOTE — ED PROVIDER NOTES
History   No chief complaint on file.    HPI  Lizz Polanco is a 46 year old female who presents by ambulance after apparent intentional drug overdose with multiple meds.  Patient is unresponsive on arrival.  I spoke to her boyfriend who presents and he states that she has been increasingly down recently.  Her friend who also presents states that she has been expressing suicidal thoughts recently.  The boyfriend states that there relationship is not been good recently but he does not know any other cause for her increased depression and suicidal thoughts.  Apparently she took multiple meds in a suicide attempt this morning.  She apologized to her boyfriend when she came downstairs after taking the meds.  This happened about 820.  When police arrived the patient was still alert and talking.  However the time the ambulance arrived she was unresponsive but breathing on her own.  In the department she is breathing on her own but does not respond to painful stimuli.  Her depression but her boyfriend and friend are unaware of previous suicide attempts.  They are unaware if she is have been hospitalized or depression.  They are attempting to contact family.  All of her pill bottles were empty.  Most recent filled was Ambien 10 mg last week with 90 tablets.  Also she had Wellbutrin  mg 30 tablet bottle, Xanax 0.25 mg 60 tablets and Zanaflex 4 mg 90 tablets which were filled in July.  Do not think she alcohol.  They are unsure if she took Tylenol or aspirin but admits she has been taking lots of ibuprofen recently for pain issues.    Allergies:  No Known Allergies    Problem List:    There are no problems to display for this patient.       Past Medical History:    No past medical history on file.    Past Surgical History:    Past Surgical History:   Procedure Laterality Date     BREAST SURGERY       ENT SURGERY         Family History:    No family history on file.    Social History:  Marital Status:    [2]  Social History     Tobacco Use     Smoking status: Former Smoker   Substance Use Topics     Alcohol use: No     Drug use: No        Medications:    meloxicam (MOBIC) 15 MG tablet  Zolpidem Tartrate (AMBIEN PO)          Review of Systems all other systems are reviewed and are negative.    Physical Exam   BP: (!) 129/90  Pulse: 62  Temp: 99.3  F (37.4  C)  Resp: 20  Weight: 58 kg (127 lb 13.9 oz) (Bed weight)  SpO2: 99 %      Physical Exam patient is unresponsive.  Her pupils are sluggishly reactive.  Does have a gag.  Secretions in her mouth.  No stridor.  Lungs are clear.  Cardiac auscultation was regular.  Abdomen was benign without response to palpation.  With nail crush she did not withdraw to pain.  She did not withdraw to sternal stimuli either.    ED Course        Procedures              EKG Interpretation:      Interpreted by Hernán Razo MD  Time reviewed: 10:30  Symptoms at time of EKG: OD   Rhythm: normal sinus   Rate: Normal  Axis: Normal  Ectopy: None  Conduction: normal  ST Segments/ T Waves: No acute ischemic changes  Q Waves: none  Comparison to prior: No old EKG available    Clinical Impression: normal EKG    With the assistance of pharmacy patient was given sedation as she was fighting intubation despite all the drugs she took.  We did paralyze her initially with sucks.  She need to be paralyzing with rocuruim.  A propofol drip was initiated for additional sedation.  Due to the patient's overdose and poor response to painful stimuli decision was made to intubate her to protect her airway.  With attempts she had secretions and emesis in her airway.  Initial attempt to intubate with glide scope was unsuccessful.  With the assist of anesthesia they were able to intubate her with a 7 5 ET tube.  It was at 21 at the lips.  Initial capnography was positive.  Initially end-tidal was not performing properly.  This was changed out and then was working.  X-ray was obtained for tube placement.  OG was  placed.  Louise catheter was placed with minimal urinary output initially.    Concerns for aspiration patient was given IV Zosyn.  Screen was positive for marijuana and for benzodiazepines.  Tylenol and aspirin levels were negative.  Alcohol level was also negative.          Critical Care time:  was 40 minutes for this patient excluding procedures.               Results for orders placed or performed during the hospital encounter of 08/25/21 (from the past 24 hour(s))   CBC with platelets differential    Narrative    The following orders were created for panel order CBC with platelets differential.  Procedure                               Abnormality         Status                     ---------                               -----------         ------                     CBC with platelets and d...[[id number]]                      Final result                 Please view results for these tests on the individual orders.   Comprehensive metabolic panel   Result Value Ref Range    Sodium 136 133 - 144 mmol/L    Potassium 3.8 3.4 - 5.3 mmol/L    Chloride 103 94 - 109 mmol/L    Carbon Dioxide (CO2) 28 20 - 32 mmol/L    Anion Gap 5 3 - 14 mmol/L    Urea Nitrogen 8 7 - 30 mg/dL    Creatinine 0.70 0.52 - 1.04 mg/dL    Calcium 8.7 8.5 - 10.1 mg/dL    Glucose 232 (H) 70 - 99 mg/dL    Alkaline Phosphatase 62 40 - 150 U/L    AST 14 0 - 45 U/L    ALT 14 0 - 50 U/L    Protein Total 6.9 6.8 - 8.8 g/dL    Albumin 3.9 3.4 - 5.0 g/dL    Bilirubin Total 0.6 0.2 - 1.3 mg/dL    GFR Estimate >90 >60 mL/min/1.73m2   Ethyl Alcohol Level   Result Value Ref Range    Alcohol ethyl <0.01 <=0.01 g/dL   Acetaminophen level   Result Value Ref Range    Acetaminophen <2 (L) 10 - 30 mg/L   Salicylate level   Result Value Ref Range    Salicylate 4 <20 mg/dL   CBC with platelets and differential   Result Value Ref Range    WBC Count 9.2 4.0 - 11.0 10e3/uL    RBC Count 4.33 3.80 - 5.20 10e6/uL    Hemoglobin 13.7 11.7 - 15.7 g/dL    Hematocrit 39.3 35.0 -  47.0 %    MCV 91 78 - 100 fL    MCH 31.6 26.5 - 33.0 pg    MCHC 34.9 31.5 - 36.5 g/dL    RDW 11.9 10.0 - 15.0 %    Platelet Count 286 150 - 450 10e3/uL    % Neutrophils 80 %    % Lymphocytes 12 %    % Monocytes 6 %    % Eosinophils 1 %    % Basophils 1 %    % Immature Granulocytes 0 %    NRBCs per 100 WBC 0 <1 /100    Absolute Neutrophils 7.3 1.6 - 8.3 10e3/uL    Absolute Lymphocytes 1.1 0.8 - 5.3 10e3/uL    Absolute Monocytes 0.6 0.0 - 1.3 10e3/uL    Absolute Eosinophils 0.1 0.0 - 0.7 10e3/uL    Absolute Basophils 0.1 0.0 - 0.2 10e3/uL    Absolute Immature Granulocytes 0.0 <=0.0 10e3/uL    Absolute NRBCs 0.0 10e3/uL   Lactic acid whole blood   Result Value Ref Range    Lactic Acid 1.4 0.7 - 2.0 mmol/L   Blood gas venous   Result Value Ref Range    pH Venous 7.40 7.32 - 7.43    pCO2 Venous 47 40 - 50 mm Hg    pO2 Venous 66 (H) 25 - 47 mm Hg    Bicarbonate Venous 29 (H) 21 - 28 mmol/L    Base Excess/Deficit (+/-) 3.4 (H) -7.7 - 1.9 mmol/L    FIO2 21    Urine Drugs of Abuse Screen    Narrative    The following orders were created for panel order Urine Drugs of Abuse Screen.  Procedure                               Abnormality         Status                     ---------                               -----------         ------                     Urine Drugs of Abuse Scr...[[id number]]  Abnormal            Final result                 Please view results for these tests on the individual orders.   Urine Drugs of Abuse Screen Panel 13   Result Value Ref Range    Cannabinoids (87-mus-7-carboxy-9-THC) Detected (A) Not Detected, Indeterminate    Phencyclidine Not Detected Not Detected, Indeterminate    Cocaine (Benzoylecgonine) Not Detected Not Detected, Indeterminate    Methamphetamine (d-Methamphetamine) Not Detected Not Detected, Indeterminate    Opiates (Morphine) Not Detected Not Detected, Indeterminate    Amphetamine (d-Amphetamine) Not Detected Not Detected, Indeterminate    Benzodiazepines (Nordiazepam) Detected (A)  Not Detected, Indeterminate    Tricyclic Antidepressants (Desipramine) Not Detected Not Detected, Indeterminate    Methadone Not Detected Not Detected, Indeterminate    Barbiturates (Butalbital) Not Detected Not Detected, Indeterminate    Oxycodone Not Detected Not Detected, Indeterminate    Propoxyphene (Norpropoxyphene) Not Detected Not Detected, Indeterminate    Buprenorphine Not Detected Not Detected, Indeterminate   INR   Result Value Ref Range    INR 1.08 0.85 - 1.15   XR Chest Port 1 View    Narrative    CHEST ONE VIEW PORTABLE   8/25/2021 9:55 AM     HISTORY:  Post intubation/OG placement.    COMPARISON: 12/19/2012      Impression    IMPRESSION: The tip of the endotracheal tube is 4.0 cm above the  christie. Nasogastric tube extends below the left hemidiaphragm. No  airspace consolidation, pneumothorax, or pleural effusion.    GOLDY PINEDA MD         SYSTEM ID:  [id number]   UA with Microscopic reflex to Culture    Specimen: Urine, Catheter   Result Value Ref Range    Color Urine Yellow Colorless, Straw, Light Yellow, Yellow    Appearance Urine Cloudy (A) Clear    Glucose Urine 50  (A) Negative mg/dL    Bilirubin Urine Negative Negative    Ketones Urine Negative Negative mg/dL    Specific Gravity Urine 1.013 1.003 - 1.035    Blood Urine Negative Negative    pH Urine 6.0 5.0 - 7.0    Protein Albumin Urine 100  (A) Negative mg/dL    Urobilinogen Urine Normal Normal, 2.0 mg/dL    Nitrite Urine Negative Negative    Leukocyte Esterase Urine Negative Negative    Mucus Urine Present (A) None Seen /LPF    RBC Urine 5 (H) <=2 /HPF    WBC Urine 4 <=5 /HPF    Squamous Epithelials Urine 11 (H) <=1 /HPF    Transitional Epithelials Urine 3 (H) <=1 /HPF    Hyaline Casts Urine 5 (H) <=2 /LPF    Narrative    Urine Culture not indicated   HCG qualitative urine (UPT)   Result Value Ref Range    hCG Urine Qualitative Negative Negative       Medications   propofol (DIPRIVAN) infusion (10 mcg/kg/min × 58 kg Intravenous Rate/Dose  Change 8/25/21 1044)   pantoprazole (PROTONIX) EC tablet 40 mg (has no administration in time range)     Or   pantoprazole (PROTONIX) 2 mg/mL suspension 40 mg (has no administration in time range)     Or   pantoprazole (PROTONIX) IV push injection 40 mg (has no administration in time range)   norepinephrine (LEVOPHED) 4 mg in  mL PERIPHERAL infusion (has no administration in time range)   piperacillin-tazobactam (ZOSYN) intermittent infusion 4.5 g (0 g Intravenous Stopped 8/25/21 1033)   etomidate (AMIDATE) injection 20 mg (20 mg Intravenous Given by Other Clinician 8/25/21 0913)   succinylcholine (ANECTINE) injection 140 mg (140 mg Intravenous Given by Other Clinician 8/25/21 0913)   rocuronium injection 50 mg (50 mg Intravenous Given by Other Clinician 8/25/21 0921)     Assessments & Plan (with Medical Decision Making)   Lizz Polanco is a 46 year old female who presents by ambulance after apparent intentional drug overdose with multiple meds.  Patient is unresponsive on arrival.  I spoke to her boyfriend who presents and he states that she has been increasingly down recently.  Her friend who also presents states that she has been expressing suicidal thoughts recently.  The boyfriend states that there relationship is not been good recently but he does not know any other cause for her increased depression and suicidal thoughts.  Apparently she took multiple meds in a suicide attempt this morning.  She apologized to her boyfriend when she came downstairs after taking the meds.  This happened about 820.  When police arrived the patient was still alert and talking.  However the time the ambulance arrived she was unresponsive but breathing on her own.  In the department she is breathing on her own but does not respond to painful stimuli.  Her depression but her boyfriend and friend are unaware of previous suicide attempts.  They are unaware if she is have been hospitalized or depression.  They are attempting  to contact family.  All of her pill bottles were empty.  Most recent filled was Ambien 10 mg last week with 90 tablets.  Also she had Wellbutrin  mg 30 tablet bottle, Xanax 0.25 mg 60 tablets and Zanaflex 4 mg 90 tablets which were filled in July.  Do not think she alcohol.  They are unsure if she took Tylenol or aspirin but admits she has been taking lots of ibuprofen recently for pain issues.  On presentation patient was afebrile, pulse was in the 80s and blood pressure is in the 130 range systolically.  She was satting 98 to 100% with nasal cannula O2.  Breathing on her own but unresponsive to painful stimuli.  Decision was made to intubate her protect her airway.  Please see above procedural notes.  EKG showed no ischemic issues..  We have no bed available at her facility.  There [facility] bed [facility] at [facility] the [facility].  Spoke with Dr. Quintana  from the hospital service.  Will assume care in transfer.  He requested a norepinephrine drip as her pressure is now in the 90s.   Patient remained vitally stable and her pressure did improve.  She required additional propofol for sedation.  I have reviewed the nursing notes.    I have reviewed the findings, diagnosis, plan and need for follow up with the patient.       New Prescriptions    No medications on file       Final diagnoses:   Overdose, intentional self-harm, initial encounter (H)   Suicidal ideation       8/25/2021   [facility] EMERGENCY DEPT     Hernán Razo MD  08/25/21 2488

## 2021-08-25 NOTE — ED NOTES
Pt sedated-propofol drip in progress. VSS. Scant amount of urine noted in sanchez cath. Friends at bedside.

## 2021-08-25 NOTE — PROGRESS NOTES
08/25/21 1226   Ventilator  - Settings    Ventilation Mode CMV/AC  (Continuous Mandatory Ventilation/ Assist Control)   Rate Set (breaths/minute) 12 breaths/min   Tidal Volume Set (mL) 450 mL   PEEP (cm H2O) 5 cmH2O   Oxygen Concentration (%) 21 %   Inspiratory Time (seconds) 1 sec   Ventilator - Patient    Patient Resp Rate  12 breaths/min   Inspiratory Pressure (cm H2O) 20 cmH2O   Mean Airway Pressure (cm H2O) 8.8 cmH2O   Expiratory Vt  mL 463   Minute Volume L/min 5.59 L/min   Additional Vent Settings   Plateau Pressure (cm H2O) 17 cmH2O   Ventilator Arm In Place Yes   Ventilator Alarms   High Inspiratory Pressure Alarm (cmH2O) 40 cm H2O   High Respiratory Rate (breaths/min) 30 breaths/min   Minute Ventilation High (L) 9.5 L/min   Minute Ventilation Low (L) 3.8 L/min

## 2021-08-25 NOTE — ANESTHESIA POSTPROCEDURE EVALUATION
Patient: Lizz Polanco    * No procedures listed *    Diagnosis:* No pre-op diagnosis entered *  Diagnosis Additional Information: No value filed.    Anesthesia Type:  No value filed.    Note:  Disposition: Inpatient (pt being transfered out to another facility )   Postop Pain Control: Uneventful            Sign Out: Well controlled pain   PONV: No   Neuro/Psych:             Sign Out: Other neuro status (here for overdose)   Airway/Respiratory:             Sign Out: AIRWAY IN SITU/Resp. Support               Airway in situ/Resp. Support: ETT   CV/Hemodynamics: Uneventful            Sign Out: Acceptable CV status; No obvious hypovolemia; No obvious fluid overload   Other NRE: NONE   DID A NON-ROUTINE EVENT OCCUR?     Event details/Postop Comments:  Pt remains intubated and sedated in the ED and is being transferred out for further care           Last vitals:  Vitals Value Taken Time   /93 08/25/21 1310   Temp     Pulse 67 08/25/21 1316   Resp 12 08/25/21 1316   SpO2 99 % 08/25/21 1316   Vitals shown include unvalidated device data.    Electronically Signed By: PRAVIN Stock CRNA  August 25, 2021  1:16 PM

## 2021-08-25 NOTE — PROGRESS NOTES
08/25/21 0922   Ventilator  - Settings    Ventilation Mode CMV/AC  (Continuous Mandatory Ventilation/ Assist Control)   Rate Set (breaths/minute) 12 breaths/min   Tidal Volume Set (mL) 450 mL   PEEP (cm H2O) 5 cmH2O   Oxygen Concentration (%) 21 %   Inspiratory Time (seconds) 1 sec   Ventilator - Patient    Patient Resp Rate  14 breaths/min   Inspiratory Pressure (cm H2O) 18 cmH2O   Mean Airway Pressure (cm H2O) 8 cmH2O   Expiratory Vt  mL 456   Minute Volume L/min 5.56 L/min   Additional Vent Settings   Plateau Pressure (cm H2O) 16 cmH2O   Ventilator Arm In Place Yes   PEEPi (cm H2O) 5.2 cm  (total peep 5.2, auto peep 0.2)   Patient intubated with 7.5 ET tube, 21 cm at the lip  Suctioned small tan thin secretions  Breath sounds are clear and equal   End-tial CO2 32

## 2021-08-25 NOTE — PROGRESS NOTES
Patient arrived from OSH intubated with a 7.5 ET tube. Secured at 21 at the lip. BBS Clear/Diminished. Patient was placed on a Servo I ventilator, See flowsheet for spicific settings. Vital signs stable continue to follow

## 2021-08-26 LAB
ALBUMIN SERPL-MCNC: 3.4 G/DL (ref 3.4–5)
ALP SERPL-CCNC: 64 U/L (ref 40–150)
ALT SERPL W P-5'-P-CCNC: 14 U/L (ref 0–50)
ANION GAP SERPL CALCULATED.3IONS-SCNC: 4 MMOL/L (ref 3–14)
ANION GAP SERPL CALCULATED.3IONS-SCNC: 5 MMOL/L (ref 3–14)
AST SERPL W P-5'-P-CCNC: 17 U/L (ref 0–45)
ATRIAL RATE - MUSE: 73 BPM
BASE EXCESS BLDV CALC-SCNC: 0.6 MMOL/L (ref -7.7–1.9)
BASE EXCESS BLDV CALC-SCNC: 3.2 MMOL/L (ref -7.7–1.9)
BILIRUB DIRECT SERPL-MCNC: 0.1 MG/DL (ref 0–0.2)
BILIRUB SERPL-MCNC: 0.5 MG/DL (ref 0.2–1.3)
BUN SERPL-MCNC: 4 MG/DL (ref 7–30)
BUN SERPL-MCNC: 5 MG/DL (ref 7–30)
CA-I BLD-MCNC: 4.4 MG/DL (ref 4.4–5.2)
CALCIUM SERPL-MCNC: 8 MG/DL (ref 8.5–10.1)
CALCIUM SERPL-MCNC: 8.3 MG/DL (ref 8.5–10.1)
CHLORIDE BLD-SCNC: 109 MMOL/L (ref 94–109)
CHLORIDE BLD-SCNC: 109 MMOL/L (ref 94–109)
CO2 SERPL-SCNC: 25 MMOL/L (ref 20–32)
CO2 SERPL-SCNC: 26 MMOL/L (ref 20–32)
CREAT SERPL-MCNC: 0.59 MG/DL (ref 0.52–1.04)
CREAT SERPL-MCNC: 0.6 MG/DL (ref 0.52–1.04)
DIASTOLIC BLOOD PRESSURE - MUSE: NORMAL MMHG
ERYTHROCYTE [DISTWIDTH] IN BLOOD BY AUTOMATED COUNT: 11.9 % (ref 10–15)
FIBRINOGEN PPP-MCNC: 338 MG/DL (ref 170–490)
GFR SERPL CREATININE-BSD FRML MDRD: >90 ML/MIN/1.73M2
GFR SERPL CREATININE-BSD FRML MDRD: >90 ML/MIN/1.73M2
GLUCOSE BLD-MCNC: 104 MG/DL (ref 70–99)
GLUCOSE BLD-MCNC: 111 MG/DL (ref 70–99)
GLUCOSE BLDC GLUCOMTR-MCNC: 104 MG/DL (ref 70–99)
GLUCOSE BLDC GLUCOMTR-MCNC: 106 MG/DL (ref 70–99)
GLUCOSE BLDC GLUCOMTR-MCNC: 107 MG/DL (ref 70–99)
GLUCOSE BLDC GLUCOMTR-MCNC: 111 MG/DL (ref 70–99)
GLUCOSE BLDC GLUCOMTR-MCNC: 98 MG/DL (ref 70–99)
HCO3 BLDV-SCNC: 25 MMOL/L (ref 21–28)
HCO3 BLDV-SCNC: 26 MMOL/L (ref 21–28)
HCT VFR BLD AUTO: 37.4 % (ref 35–47)
HGB BLD-MCNC: 13.1 G/DL (ref 11.7–15.7)
HGB BLD-MCNC: 13.2 G/DL (ref 11.7–15.7)
INR PPP: 1.04 (ref 0.86–1.14)
INTERPRETATION ECG - MUSE: NORMAL
MAGNESIUM SERPL-MCNC: 2 MG/DL (ref 1.6–2.3)
MAGNESIUM SERPL-MCNC: 2.1 MG/DL (ref 1.6–2.3)
MCH RBC QN AUTO: 31.5 PG (ref 26.5–33)
MCHC RBC AUTO-ENTMCNC: 35.3 G/DL (ref 31.5–36.5)
MCV RBC AUTO: 89 FL (ref 78–100)
O2/TOTAL GAS SETTING VFR VENT: 25 %
O2/TOTAL GAS SETTING VFR VENT: 25 %
OXYHGB MFR BLDV: 96 % (ref 70–75)
P AXIS - MUSE: 50 DEGREES
PCO2 BLDV: 34 MM HG (ref 40–50)
PCO2 BLDV: 37 MM HG (ref 40–50)
PH BLDV: 7.43 [PH] (ref 7.32–7.43)
PH BLDV: 7.5 [PH] (ref 7.32–7.43)
PHOSPHATE SERPL-MCNC: 3.2 MG/DL (ref 2.5–4.5)
PLATELET # BLD AUTO: 242 10E3/UL (ref 150–450)
PO2 BLDV: 77 MM HG (ref 25–47)
PO2 BLDV: 80 MM HG (ref 25–47)
POTASSIUM BLD-SCNC: 3.4 MMOL/L (ref 3.4–5.3)
POTASSIUM BLD-SCNC: 3.5 MMOL/L (ref 3.4–5.3)
POTASSIUM BLD-SCNC: 4 MMOL/L (ref 3.4–5.3)
PR INTERVAL - MUSE: 178 MS
PROT SERPL-MCNC: 6.2 G/DL (ref 6.8–8.8)
QRS DURATION - MUSE: 78 MS
QT - MUSE: 410 MS
QTC - MUSE: 451 MS
R AXIS - MUSE: 21 DEGREES
RBC # BLD AUTO: 4.19 10E6/UL (ref 3.8–5.2)
SODIUM SERPL-SCNC: 139 MMOL/L (ref 133–144)
SODIUM SERPL-SCNC: 139 MMOL/L (ref 133–144)
SYSTOLIC BLOOD PRESSURE - MUSE: NORMAL MMHG
T AXIS - MUSE: 43 DEGREES
VENTRICULAR RATE- MUSE: 73 BPM
WBC # BLD AUTO: 10.1 10E3/UL (ref 4–11)

## 2021-08-26 PROCEDURE — 93010 ELECTROCARDIOGRAM REPORT: CPT | Performed by: INTERNAL MEDICINE

## 2021-08-26 PROCEDURE — 36415 COLL VENOUS BLD VENIPUNCTURE: CPT | Performed by: PHYSICIAN ASSISTANT

## 2021-08-26 PROCEDURE — 258N000003 HC RX IP 258 OP 636: Performed by: PHYSICIAN ASSISTANT

## 2021-08-26 PROCEDURE — 250N000011 HC RX IP 250 OP 636: Performed by: PHYSICIAN ASSISTANT

## 2021-08-26 PROCEDURE — C9113 INJ PANTOPRAZOLE SODIUM, VIA: HCPCS | Performed by: NURSE PRACTITIONER

## 2021-08-26 PROCEDURE — 82803 BLOOD GASES ANY COMBINATION: CPT | Performed by: NURSE PRACTITIONER

## 2021-08-26 PROCEDURE — 82330 ASSAY OF CALCIUM: CPT | Performed by: PHYSICIAN ASSISTANT

## 2021-08-26 PROCEDURE — 80048 BASIC METABOLIC PNL TOTAL CA: CPT | Performed by: PHYSICIAN ASSISTANT

## 2021-08-26 PROCEDURE — 85027 COMPLETE CBC AUTOMATED: CPT | Performed by: PHYSICIAN ASSISTANT

## 2021-08-26 PROCEDURE — 36415 COLL VENOUS BLD VENIPUNCTURE: CPT | Performed by: NURSE PRACTITIONER

## 2021-08-26 PROCEDURE — 85610 PROTHROMBIN TIME: CPT | Performed by: NURSE PRACTITIONER

## 2021-08-26 PROCEDURE — 82248 BILIRUBIN DIRECT: CPT | Performed by: NURSE PRACTITIONER

## 2021-08-26 PROCEDURE — 83735 ASSAY OF MAGNESIUM: CPT | Performed by: PHYSICIAN ASSISTANT

## 2021-08-26 PROCEDURE — 93005 ELECTROCARDIOGRAM TRACING: CPT

## 2021-08-26 PROCEDURE — 82805 BLOOD GASES W/O2 SATURATION: CPT | Performed by: PHYSICIAN ASSISTANT

## 2021-08-26 PROCEDURE — 99291 CRITICAL CARE FIRST HOUR: CPT | Performed by: PHYSICIAN ASSISTANT

## 2021-08-26 PROCEDURE — 85018 HEMOGLOBIN: CPT | Performed by: PHYSICIAN ASSISTANT

## 2021-08-26 PROCEDURE — 85384 FIBRINOGEN ACTIVITY: CPT | Performed by: NURSE PRACTITIONER

## 2021-08-26 PROCEDURE — 250N000011 HC RX IP 250 OP 636: Performed by: NURSE PRACTITIONER

## 2021-08-26 PROCEDURE — 999N000157 HC STATISTIC RCP TIME EA 10 MIN

## 2021-08-26 PROCEDURE — 84100 ASSAY OF PHOSPHORUS: CPT | Performed by: PHYSICIAN ASSISTANT

## 2021-08-26 PROCEDURE — 94003 VENT MGMT INPAT SUBQ DAY: CPT

## 2021-08-26 PROCEDURE — 250N000013 HC RX MED GY IP 250 OP 250 PS 637: Performed by: PHYSICIAN ASSISTANT

## 2021-08-26 PROCEDURE — 200N000002 HC R&B ICU UMMC

## 2021-08-26 PROCEDURE — 84132 ASSAY OF SERUM POTASSIUM: CPT | Performed by: PHYSICIAN ASSISTANT

## 2021-08-26 PROCEDURE — 250N000009 HC RX 250: Performed by: PHYSICIAN ASSISTANT

## 2021-08-26 RX ORDER — POTASSIUM CHLORIDE 7.45 MG/ML
10 INJECTION INTRAVENOUS
Status: DISPENSED | OUTPATIENT
Start: 2021-08-26 | End: 2021-08-26

## 2021-08-26 RX ORDER — NALOXONE HYDROCHLORIDE 0.4 MG/ML
0.4 INJECTION, SOLUTION INTRAMUSCULAR; INTRAVENOUS; SUBCUTANEOUS
Status: DISCONTINUED | OUTPATIENT
Start: 2021-08-26 | End: 2021-08-29 | Stop reason: HOSPADM

## 2021-08-26 RX ORDER — MAGNESIUM SULFATE HEPTAHYDRATE 40 MG/ML
2 INJECTION, SOLUTION INTRAVENOUS ONCE
Status: COMPLETED | OUTPATIENT
Start: 2021-08-26 | End: 2021-08-26

## 2021-08-26 RX ORDER — HALOPERIDOL 5 MG/ML
5 INJECTION INTRAMUSCULAR EVERY 4 HOURS PRN
Status: DISCONTINUED | OUTPATIENT
Start: 2021-08-26 | End: 2021-08-28

## 2021-08-26 RX ORDER — HALOPERIDOL 5 MG/ML
5-10 INJECTION INTRAMUSCULAR EVERY 4 HOURS PRN
Status: DISCONTINUED | OUTPATIENT
Start: 2021-08-26 | End: 2021-08-26

## 2021-08-26 RX ORDER — NALOXONE HYDROCHLORIDE 0.4 MG/ML
0.2 INJECTION, SOLUTION INTRAMUSCULAR; INTRAVENOUS; SUBCUTANEOUS
Status: DISCONTINUED | OUTPATIENT
Start: 2021-08-26 | End: 2021-08-29 | Stop reason: HOSPADM

## 2021-08-26 RX ORDER — POTASSIUM CHLORIDE 29.8 MG/ML
20 INJECTION INTRAVENOUS ONCE
Status: DISCONTINUED | OUTPATIENT
Start: 2021-08-26 | End: 2021-08-26

## 2021-08-26 RX ORDER — HYDROMORPHONE HYDROCHLORIDE 1 MG/ML
.3-.5 INJECTION, SOLUTION INTRAMUSCULAR; INTRAVENOUS; SUBCUTANEOUS
Status: DISCONTINUED | OUTPATIENT
Start: 2021-08-26 | End: 2021-08-29 | Stop reason: HOSPADM

## 2021-08-26 RX ADMIN — PROPOFOL 15 MG: 10 INJECTION, EMULSION INTRAVENOUS at 11:50

## 2021-08-26 RX ADMIN — ENOXAPARIN SODIUM 30 MG: 30 INJECTION SUBCUTANEOUS at 20:42

## 2021-08-26 RX ADMIN — POTASSIUM CHLORIDE 10 MEQ: 7.46 INJECTION, SOLUTION INTRAVENOUS at 05:34

## 2021-08-26 RX ADMIN — PROPOFOL 20 MG: 10 INJECTION, EMULSION INTRAVENOUS at 00:09

## 2021-08-26 RX ADMIN — SODIUM CHLORIDE, POTASSIUM CHLORIDE, SODIUM LACTATE AND CALCIUM CHLORIDE: 600; 310; 30; 20 INJECTION, SOLUTION INTRAVENOUS at 09:41

## 2021-08-26 RX ADMIN — POTASSIUM CHLORIDE 10 MEQ: 7.46 INJECTION, SOLUTION INTRAVENOUS at 15:35

## 2021-08-26 RX ADMIN — MAGNESIUM SULFATE 2 G: 2 INJECTION INTRAVENOUS at 06:38

## 2021-08-26 RX ADMIN — PROPOFOL 60 MCG/KG/MIN: 10 INJECTION, EMULSION INTRAVENOUS at 18:16

## 2021-08-26 RX ADMIN — PROPOFOL 10 MG: 10 INJECTION, EMULSION INTRAVENOUS at 06:29

## 2021-08-26 RX ADMIN — NICOTINE 7 MG/24 HR DAILY TRANSDERMAL PATCH 1 PATCH: at 08:57

## 2021-08-26 RX ADMIN — DEXMEDETOMIDINE HYDROCHLORIDE 0.2 MCG/KG/HR: 100 INJECTION, SOLUTION INTRAVENOUS at 11:00

## 2021-08-26 RX ADMIN — DEXMEDETOMIDINE HYDROCHLORIDE 1.2 MCG/KG/HR: 100 INJECTION, SOLUTION INTRAVENOUS at 18:15

## 2021-08-26 RX ADMIN — POTASSIUM CHLORIDE 10 MEQ: 7.46 INJECTION, SOLUTION INTRAVENOUS at 10:41

## 2021-08-26 RX ADMIN — PROPOFOL 75 MCG/KG/MIN: 10 INJECTION, EMULSION INTRAVENOUS at 02:43

## 2021-08-26 RX ADMIN — PANTOPRAZOLE SODIUM 40 MG: 40 INJECTION, POWDER, FOR SOLUTION INTRAVENOUS at 01:23

## 2021-08-26 RX ADMIN — PROPOFOL 50 MCG/KG/MIN: 10 INJECTION, EMULSION INTRAVENOUS at 23:53

## 2021-08-26 RX ADMIN — PROPOFOL 10 MG: 10 INJECTION, EMULSION INTRAVENOUS at 06:22

## 2021-08-26 RX ADMIN — PROPOFOL 75 MCG/KG/MIN: 10 INJECTION, EMULSION INTRAVENOUS at 06:16

## 2021-08-26 RX ADMIN — PROPOFOL 75 MCG/KG/MIN: 10 INJECTION, EMULSION INTRAVENOUS at 13:34

## 2021-08-26 RX ADMIN — PROPOFOL 75 MCG/KG/MIN: 10 INJECTION, EMULSION INTRAVENOUS at 09:41

## 2021-08-26 RX ADMIN — DEXMEDETOMIDINE HYDROCHLORIDE 1.2 MCG/KG/HR: 100 INJECTION, SOLUTION INTRAVENOUS at 23:52

## 2021-08-26 RX ADMIN — MIDAZOLAM HYDROCHLORIDE 2 MG: 1 INJECTION, SOLUTION INTRAMUSCULAR; INTRAVENOUS at 06:39

## 2021-08-26 RX ADMIN — MIDAZOLAM HYDROCHLORIDE 2 MG: 1 INJECTION, SOLUTION INTRAMUSCULAR; INTRAVENOUS at 12:47

## 2021-08-26 RX ADMIN — PANTOPRAZOLE SODIUM 40 MG: 40 INJECTION, POWDER, FOR SOLUTION INTRAVENOUS at 08:54

## 2021-08-26 RX ADMIN — POTASSIUM CHLORIDE 10 MEQ: 7.46 INJECTION, SOLUTION INTRAVENOUS at 11:44

## 2021-08-26 RX ADMIN — PANTOPRAZOLE SODIUM 40 MG: 40 INJECTION, POWDER, FOR SOLUTION INTRAVENOUS at 20:42

## 2021-08-26 RX ADMIN — PROPOFOL 10 MG: 10 INJECTION, EMULSION INTRAVENOUS at 00:49

## 2021-08-26 RX ADMIN — MIDAZOLAM HYDROCHLORIDE 2 MG: 1 INJECTION, SOLUTION INTRAMUSCULAR; INTRAVENOUS at 11:58

## 2021-08-26 NOTE — PROGRESS NOTES
[facility]    ICU Progress Note       Date of Admission:  8/25/2021    Assessment: Critical Care   Lizz Polanco is a 46 year old female admitted to ICU on 8/25/2021. She presented unresponsive to OSH by ambulance after apparent intentional drug overdose with multiple meds.  Recent history of depression and suicidal thought validated by boyfriend and friend.  Apparently she took multiple meds in a suicide attempt in the morning on day of admission and apologized to her boyfriend after taking the meds.  Medications bottles were empty, Ambien 10 mg 90 tablets (refilled 8/16),  Wellbutrin  mg 30 tablet (refill 8/17), Xanax 0.25 mg 60 tablets (8/7) and Zanaflex 4 mg 90 tablets (filled July).  Recent history of ibuprofen use for ongoing pain.    Changes and Major Things Today  - Added Precedex  - Add Haldol  - Increased propofol  - Discontinue benzos  - Added dilauded  - PS trials  - Repeat BMP & Hgb this afternoon      Neuro/ pain/ sedation:  # Poly drug overdose  # Acute CNS depression  # Suicidal Ideation  # Acute aggitation  # Encephalopathy  Concern for patient taking an unknown amount of Ambien 10 mg 90 tablets (refilled 8/16),  Wellbutrin  mg 30 tablet (refill 8/17), Xanax 0.25 mg 60 tablets (8/7) and Zanaflex 4 mg 90 tablets (filled July).  Recent history of ibuprofen use for ongoing pain.  Per boyfriend, patient's anxiety and suicidal ideation has been increasing over the past few weeks; her coping mechanism tends to be self harm.  Boyfriend states that he is unaware of suicidal ideation beyond the previous two weeks.   OSH labs included acetaminophen level, ethanol, and salicylate level, lactic acid; all negative.  Urine drug screen panel (OSH) positive for cannabinoids and benzodiazepines.  Per chart, patient received propofol 100mg x 2 boluses + gtt and versed bolus for agitation in the ED.       - Add Haldol PRN for agitation  - Continue propofol  "gtt @ 60, wean as tolerated.  CK and triglycerides per protocol.  - Add Precedex, maintain at 1.2 for now  - Discontinue benzos now that >36 hours from overdose on Wellbutrin.  Will titrate above sedation medications as tolerated to awaken patient while minimizing agitation.   - Monitor neurological status to include risk for siezures. Notify provider for any acute changes in exam  - Psychiatry consult when appropriate given recent hx of suspected suicidal attempt with drug overdose  Consulted with poison control again today, appreciate the recommendations.               -  Recommendations: Continue with either propofol and/or benzo as risk of seizure is still present given patient tachycardia, agitation, and risk can extend just beyond the 24 hour point of Wellbutrin overdose.  ICU will re-evaluate the need for propofol use tomorrow morning   -Wellbutrin XR is the most concerning medication ingested considering high risk of seizures, tachycardia, arhythmia, QRS widening.  Effects of Wellbutrin XR can last for 18-24 hours.                -Treat any seizures with ativan. Consider versed if severe              - treat QRS >120 with bicarb bolus + bicarb gtt.  If QTc >500 treat with Mg bolus.  Maintain high replacement protocols for potassium and magnesium.                -Daily EKGs.  No fentanyl use as this is contraindicated.              - Use caution in treating hypertension as Wellbutrin XR can often cause hypotension later in metabolism.     # Tobacco use  Per boyfriend, Hank, patient smokes 1 pack per day, unknown length of history  - Nicoderm patch starting tomorrow morning     # Alcohol abuse disorder  Per patient's boyfriend, she does not consistently consume alcohol; however, she will \"binge on 2-3 drinks on occasion when stressed\"  - Ativan prn for anxiety  - no indication for CIWA protocol at this time given PMH     Pulmonary:  # Acute respiratory hypoxia, improving  # Concern for airway compromise, s/p " mechanical ventilation  At ED OSH the ETT entered the stomach during initial intubation, gastric content was present and suctioned. Tube was removed, rocuronium administered, second attempt successful  Ventilation Mode: (S) CPAP/PS  (Continuous positive airway pressure with Pressure Support)  FiO2 (%): 25 %  Rate Set (breaths/minute): 12 breaths/min  Tidal Volume Set (mL): 400 mL  PEEP (cm H2O): 5 cmH2O  Pressure Support (cm H2O): 7 cmH2O  Oxygen Concentration (%): 25 %  Resp: (!) 5    -Return to ordered CMV settings when not pressure support   Patient overbreathing the vent when on CMV 12/400/525, patient RR 28.  - Plan to remain intubated through the rest of today and night as patient is requiring increasing amount of sedation to curtail agitation and  propofol used to reduce risk of seizure as per above. PS trials as tolerated   - supplemental oxygen to keep saturation about 92%  - Wean vent as tolerated     Cardiovascular:  # Acute hypotension, resolved  # Hypertension  Hypertension source likely due to agitation with residual effects from Wellbutrin XR overdose   - Permissive hypertension <180 systolic.  Per Poison Control, use caution in treating blood pressure as often times overdose on Wellbutrin can cause hypotension later in the course  -  - Maintain MAP >65, systolic >90  - Repeat EKG   - Daily ECGs  - Consider sodium bicarb if QRS >120  - Atropine for bradycardia  - Monitor hemodynamic status.  - Ionized calcium     GI/Nutrition:  Unknown gastric content through NG suction this morning was brown-red in color, coffee grounds appearance.    -Will continue to monitory, trend hemoglobin this afternoon and daily.  - Diet:  NPO  - OG LIS     Renal/ Fluid Balance:   # Primary metabolic alkalosis with secondary respiratory acidosis  UA from OSH with protein and RBC.  - Will monitor intake and output   - High replacement protocol for Potassium; K+ >4  - High replacement protocol for Magnesium; Mg >2  - Daily BMP  "and Mg  - LR @ 75mL/hr for IV fluid hydration     Endocrine:  No active concerns  - Monitoring for hypoglycemia per protocol     ID:  # Concern for aspiration in the setting of esophageal intubation    - Will continue to monitor respiratory status, Tmax, and leukocytosis     Hematology:  No acute concerns  - Daily CBC     MSK:   - PT and OT consulted. Appreciate recommendations.     Lines/ tubes/ drains: ETT @ 21, OG @ 50 cm, R arm PIV, L arm PIV  Louise Catheter: PRESENT for strict I/O  Central Lines: None     Prophylaxis:  - DVT Prophylaxis: Lovenox, Pneumatic Compression Devices  - PUD Prophylaxis: PPI     Next of Kin: Patient's mother lives in [place] but is unable to visit as result of the  having COVID-19.  Patient has an adult child with developmental delays and 2 teenaged children who live with her ex-.  Patient's boyfriend and fried visit daily.     Code Status:  Full Code     Disposition:  - ICU, critical condition     The patient's care was discussed with the Attending Physician, Dr. Torres.  Critical care time exclusive of procedures:  65 minutes    Elie Teague PA-C  [facility]  Securely message with the Vocera Web Console (learn more here)  Text page via [employer] Paging/Directory      ______________________________________________________________________    Interval History    Patient intermittently agitated and restless requiring increase in sedation.  Wrote on a piece of paper that she \"wants to die\".  Gastric content in OG brownish-red coffee ground appearing (250 mL in 12 hour period).    Physical Exam   Vital Signs: Temp: 98  F (36.7  C) Temp src: Axillary BP: 133/89 Pulse: 92   Resp: 12 SpO2: 98 % O2 Device: Mechanical Ventilator    Weight: 117 lbs 8.08 oz    General: agitated, moving frequently in bed, with restraints  HEENT: PERRL, normocephalic/atraumatic, anicteric sclera, ETT in place with OG  NEURO: RASS -3, follows some commands, " moves all limbs, no evidence of focal deficits  PULM: Bilateral breathsounds clear to auscultatilon without rhonchi, wheezing, or crackles, bilateral rise of the chest  CV: RR, no M/G/R  ABDOMEN: Normoactive bowel sounds, soft, non-distended, no guarding  : sanchez in place, urine clear and yellow  MUSK: extremities with no evidence of edema, well perfused, warm to palpation, pedal pulses appreciated    Data   I reviewed all medications, new labs and imaging results over the last 24 hours.  Arterial Blood Gases   No lab results found in last 7 days.    Complete Blood Count   Recent Labs   Lab 08/26/21 0442 08/25/21  1705 08/25/21  0919   WBC 10.1 11.7* 9.2   HGB 13.2 12.5 13.7    226 286       Basic Metabolic Panel  Recent Labs   Lab 08/26/21 0442 08/26/21  0411 08/26/21  0009 08/25/21 1957 08/25/21 1705 08/25/21  0919     --   --   --  142 136   POTASSIUM 3.4  --   --   --  3.0* 3.8   CHLORIDE 109  --   --   --  113* 103   CO2 26  --   --   --  26 28   BUN 5*  --   --   --  8 8   CR 0.59  --   --   --  0.72 0.70   * 111* 104* 108* 80 232*       Liver Function Tests  Recent Labs   Lab 08/26/21 0442 08/25/21 1705 08/25/21  0934 08/25/21  0919   AST 17 16  --  14   ALT 14 14  --  14   ALKPHOS 64 55  --  62   BILITOTAL 0.5 0.6  --  0.6   ALBUMIN 3.4 3.2*  --  3.9   INR 1.04  --  1.08  --        Pancreatic Enzymes  No lab results found in last 7 days.    Coagulation Profile  Recent Labs   Lab 08/26/21 0442 08/25/21  0934   INR 1.04 1.08       IMAGING:  Recent Results (from the past 24 hour(s))   XR Chest Port 1 View    Narrative    CHEST ONE VIEW PORTABLE   8/25/2021 9:55 AM     HISTORY:  Post intubation/OG placement.    COMPARISON: 12/19/2012      Impression    IMPRESSION: The tip of the endotracheal tube is 4.0 cm above the  christie. Nasogastric tube extends below the left hemidiaphragm. No  airspace consolidation, pneumothorax, or pleural effusion.    GOLDY PINEDA MD         SYSTEM ID:   [id number]   XR Chest Port 1 View    Narrative    EXAM: XR CHEST PORT 1 VIEW  8/25/2021 4:39 PM     HISTORY:  Endotracheal tube positioning       COMPARISON:  Chest x-ray from same date at 0936 hours    FINDINGS:   Frontal radiograph of the chest. Endotracheal tube tip projects over  the mid thoracic trachea, approximately 3.9 cm above the christie.  Enteric tube courses below the diaphragm and out of field of view.  Trachea is midline. Cardiac silhouette is stable. Mild diffuse  interstitial opacities. No significant pleural effusion. No  pneumothorax.      Impression    IMPRESSION:   1. Endotracheal tube tip projects over the mid thoracic trachea,  ~3.9 cm above the christie.  2. Mild diffuse interstitial opacities, likely interstitial edema.    I have personally reviewed the examination and initial interpretation  and I agree with the findings.    EREN MCFARLAND MD         SYSTEM ID:  [id number]   XR Abdomen Port 1 View    Narrative    EXAM: XR ABDOMEN PORT 1 VIEWS  8/25/2021 4:42 PM     HISTORY:  Check NG/OG tube placement       COMPARISON:  None    FINDINGS:   Supine radiograph of the abdomen. Enteric tube tip projects over the  stomach. The sidehole is at the gastroesophageal junction.  Nonobstructive bowel gas pattern. No portal venous gas. Visualized  lung bases are clear. Osseous structures are within normal limits.      Impression    IMPRESSION:  Enteric tube tip projects over the stomach; the sidehole is at the  gastroesophageal junction. Recommend advancing 6 to 8 cm.     I have personally reviewed the examination and initial interpretation  and I agree with the findings.    EREN MCFARLAND MD         SYSTEM ID:  [id number]

## 2021-08-26 NOTE — ANESTHESIA POSTPROCEDURE EVALUATION
Patient: Lizz Polanco    * No procedures listed *    Diagnosis:* No pre-op diagnosis entered *  Diagnosis Additional Information: No value filed.    Anesthesia Type:  No value filed.    Note:  Disposition: ICU (transfer to [facility])            ICU Sign Out: Anesthesiologist/ICU physician sign out WAS performed   Postop Pain Control: Uneventful   PONV: No   Neuro/Psych: Uneventful            Sign Out: Other neuro status (sedated/intubated)   Airway/Respiratory: Uneventful            Sign Out: AIRWAY IN SITU/Resp. Support               Airway in situ/Resp. Support: ETT   CV/Hemodynamics: Uneventful            Sign Out: Acceptable CV status   Other NRE: NONE   DID A NON-ROUTINE EVENT OCCUR? No    Event details/Postop Comments:  Pt was happy with anesthesia care.  No complications.  I will follow up with the pt if needed.           Last vitals:  Vitals Value Taken Time   BP     Temp     Pulse 101 08/26/21 1220   Resp 42 08/26/21 1220   SpO2 100 % 08/26/21 1220   Vitals shown include unvalidated device data.    Electronically Signed By: PRAVIN Saravia CRNA  August 26, 2021  12:21 PM

## 2021-08-26 NOTE — PROVIDER NOTIFICATION
CHRIS Josee, notified of OG output color change-looking more dark red from green. Patient hemodynamically stable. Output decreasing.   -Per Josee plan to give Protonix.

## 2021-08-26 NOTE — PLAN OF CARE
Shift: 08/25/21 1126-5778  Neuro: RASS 1 to -1, restless/agitated at times, inconsistent with following commands, reoriented/reassurance provided, able to communicate in writing, patient expressed she wanted to die on paper.    CV: SR. HTN at times.  PU: mechanically vented at 60% 14 400 5. LS clear.   GI: BS hyperactive, no BM this shift. OG to intermittent suction, green output. NPO. BG 84.  : sanchez patent, adequate output.  Skin: intact, mepilex placed, preventative.  Lines: R forearm PIV, infusing propofol at 75mcg/kg/min. L hand PIV, infusing LR at 75ml/hr.   Other info: K+ 3.0, replacing per protocol. Phos 1.6, replacing per protocol. Restraints in place. Seizure precautions, no seizure noted this shift. Friend and significant other at bedside. Will continue to monitor.

## 2021-08-27 LAB
ALBUMIN SERPL-MCNC: 3 G/DL (ref 3.4–5)
ALP SERPL-CCNC: 58 U/L (ref 40–150)
ALT SERPL W P-5'-P-CCNC: 12 U/L (ref 0–50)
ANION GAP SERPL CALCULATED.3IONS-SCNC: 7 MMOL/L (ref 3–14)
AST SERPL W P-5'-P-CCNC: 12 U/L (ref 0–45)
BASE EXCESS BLDV CALC-SCNC: -0.6 MMOL/L (ref -7.7–1.9)
BILIRUB SERPL-MCNC: 0.3 MG/DL (ref 0.2–1.3)
BUN SERPL-MCNC: 7 MG/DL (ref 7–30)
CALCIUM SERPL-MCNC: 8 MG/DL (ref 8.5–10.1)
CHLORIDE BLD-SCNC: 108 MMOL/L (ref 94–109)
CO2 SERPL-SCNC: 24 MMOL/L (ref 20–32)
CREAT SERPL-MCNC: 0.6 MG/DL (ref 0.52–1.04)
ERYTHROCYTE [DISTWIDTH] IN BLOOD BY AUTOMATED COUNT: 11.9 % (ref 10–15)
GFR SERPL CREATININE-BSD FRML MDRD: >90 ML/MIN/1.73M2
GLUCOSE BLD-MCNC: 102 MG/DL (ref 70–99)
GLUCOSE BLDC GLUCOMTR-MCNC: 102 MG/DL (ref 70–99)
GLUCOSE BLDC GLUCOMTR-MCNC: 105 MG/DL (ref 70–99)
GLUCOSE BLDC GLUCOMTR-MCNC: 126 MG/DL (ref 70–99)
GLUCOSE BLDC GLUCOMTR-MCNC: 90 MG/DL (ref 70–99)
GLUCOSE BLDC GLUCOMTR-MCNC: 99 MG/DL (ref 70–99)
HCO3 BLDV-SCNC: 23 MMOL/L (ref 21–28)
HCT VFR BLD AUTO: 37.4 % (ref 35–47)
HGB BLD-MCNC: 12.5 G/DL (ref 11.7–15.7)
MAGNESIUM SERPL-MCNC: 2.3 MG/DL (ref 1.6–2.3)
MCH RBC QN AUTO: 31.3 PG (ref 26.5–33)
MCHC RBC AUTO-ENTMCNC: 33.4 G/DL (ref 31.5–36.5)
MCV RBC AUTO: 94 FL (ref 78–100)
O2/TOTAL GAS SETTING VFR VENT: 25 %
PCO2 BLDV: 35 MM HG (ref 40–50)
PH BLDV: 7.43 [PH] (ref 7.32–7.43)
PHOSPHATE SERPL-MCNC: 2.7 MG/DL (ref 2.5–4.5)
PLATELET # BLD AUTO: 208 10E3/UL (ref 150–450)
PO2 BLDV: 52 MM HG (ref 25–47)
POTASSIUM BLD-SCNC: 3.7 MMOL/L (ref 3.4–5.3)
PROT SERPL-MCNC: 5.9 G/DL (ref 6.8–8.8)
RBC # BLD AUTO: 4 10E6/UL (ref 3.8–5.2)
SODIUM SERPL-SCNC: 139 MMOL/L (ref 133–144)
WBC # BLD AUTO: 8.9 10E3/UL (ref 4–11)

## 2021-08-27 PROCEDURE — 93010 ELECTROCARDIOGRAM REPORT: CPT | Performed by: INTERNAL MEDICINE

## 2021-08-27 PROCEDURE — 258N000003 HC RX IP 258 OP 636: Performed by: NURSE PRACTITIONER

## 2021-08-27 PROCEDURE — 250N000009 HC RX 250: Performed by: NURSE PRACTITIONER

## 2021-08-27 PROCEDURE — 250N000013 HC RX MED GY IP 250 OP 250 PS 637: Performed by: PHYSICIAN ASSISTANT

## 2021-08-27 PROCEDURE — 258N000003 HC RX IP 258 OP 636: Performed by: PHYSICIAN ASSISTANT

## 2021-08-27 PROCEDURE — 82040 ASSAY OF SERUM ALBUMIN: CPT | Performed by: PHYSICIAN ASSISTANT

## 2021-08-27 PROCEDURE — 250N000011 HC RX IP 250 OP 636: Performed by: PHYSICIAN ASSISTANT

## 2021-08-27 PROCEDURE — 250N000009 HC RX 250: Performed by: PHYSICIAN ASSISTANT

## 2021-08-27 PROCEDURE — 250N000013 HC RX MED GY IP 250 OP 250 PS 637: Performed by: NURSE PRACTITIONER

## 2021-08-27 PROCEDURE — 93005 ELECTROCARDIOGRAM TRACING: CPT

## 2021-08-27 PROCEDURE — 999N000157 HC STATISTIC RCP TIME EA 10 MIN

## 2021-08-27 PROCEDURE — 84100 ASSAY OF PHOSPHORUS: CPT | Performed by: NURSE PRACTITIONER

## 2021-08-27 PROCEDURE — 83735 ASSAY OF MAGNESIUM: CPT | Performed by: NURSE PRACTITIONER

## 2021-08-27 PROCEDURE — C9113 INJ PANTOPRAZOLE SODIUM, VIA: HCPCS | Performed by: NURSE PRACTITIONER

## 2021-08-27 PROCEDURE — 200N000002 HC R&B ICU UMMC

## 2021-08-27 PROCEDURE — 82803 BLOOD GASES ANY COMBINATION: CPT | Performed by: PHYSICIAN ASSISTANT

## 2021-08-27 PROCEDURE — 36415 COLL VENOUS BLD VENIPUNCTURE: CPT | Performed by: PHYSICIAN ASSISTANT

## 2021-08-27 PROCEDURE — 94003 VENT MGMT INPAT SUBQ DAY: CPT

## 2021-08-27 PROCEDURE — 85027 COMPLETE CBC AUTOMATED: CPT | Performed by: PHYSICIAN ASSISTANT

## 2021-08-27 PROCEDURE — 250N000011 HC RX IP 250 OP 636: Performed by: NURSE PRACTITIONER

## 2021-08-27 RX ORDER — LORAZEPAM 0.5 MG/1
.5-1 TABLET ORAL EVERY 4 HOURS PRN
Status: DISCONTINUED | OUTPATIENT
Start: 2021-08-27 | End: 2021-08-28

## 2021-08-27 RX ORDER — POTASSIUM CHLORIDE 7.45 MG/ML
10 INJECTION INTRAVENOUS ONCE
Status: COMPLETED | OUTPATIENT
Start: 2021-08-27 | End: 2021-08-27

## 2021-08-27 RX ADMIN — LORAZEPAM 1 MG: 0.5 TABLET ORAL at 17:50

## 2021-08-27 RX ADMIN — POTASSIUM CHLORIDE 10 MEQ: 7.46 INJECTION, SOLUTION INTRAVENOUS at 05:16

## 2021-08-27 RX ADMIN — SODIUM CHLORIDE, POTASSIUM CHLORIDE, SODIUM LACTATE AND CALCIUM CHLORIDE: 600; 310; 30; 20 INJECTION, SOLUTION INTRAVENOUS at 04:22

## 2021-08-27 RX ADMIN — NICOTINE 7 MG/24 HR DAILY TRANSDERMAL PATCH 1 PATCH: at 07:56

## 2021-08-27 RX ADMIN — PROPOFOL 20 MG: 10 INJECTION, EMULSION INTRAVENOUS at 05:44

## 2021-08-27 RX ADMIN — HALOPERIDOL LACTATE 5 MG: 5 INJECTION, SOLUTION INTRAMUSCULAR at 06:04

## 2021-08-27 RX ADMIN — ENOXAPARIN SODIUM 30 MG: 30 INJECTION SUBCUTANEOUS at 21:22

## 2021-08-27 RX ADMIN — HYDROMORPHONE HYDROCHLORIDE 0.3 MG: 1 INJECTION, SOLUTION INTRAMUSCULAR; INTRAVENOUS; SUBCUTANEOUS at 11:28

## 2021-08-27 RX ADMIN — Medication 5 MG: at 21:23

## 2021-08-27 RX ADMIN — PROPOFOL 20 MG: 10 INJECTION, EMULSION INTRAVENOUS at 03:27

## 2021-08-27 RX ADMIN — PANTOPRAZOLE SODIUM 40 MG: 40 INJECTION, POWDER, FOR SOLUTION INTRAVENOUS at 21:22

## 2021-08-27 RX ADMIN — PROPOFOL 50 MCG/KG/MIN: 10 INJECTION, EMULSION INTRAVENOUS at 05:43

## 2021-08-27 RX ADMIN — SODIUM PHOSPHATE, MONOBASIC, MONOHYDRATE AND SODIUM PHOSPHATE, DIBASIC, ANHYDROUS 9 MMOL: 276; 142 INJECTION, SOLUTION INTRAVENOUS at 06:16

## 2021-08-27 RX ADMIN — PANTOPRAZOLE SODIUM 40 MG: 40 INJECTION, POWDER, FOR SOLUTION INTRAVENOUS at 07:51

## 2021-08-27 RX ADMIN — DEXMEDETOMIDINE HYDROCHLORIDE 1 MCG/KG/HR: 100 INJECTION, SOLUTION INTRAVENOUS at 06:38

## 2021-08-27 NOTE — PLAN OF CARE
ICU End of Shift Summary.  For vital signs and complete assessments, please see documentation flowsheets.     Pertinent assessments: Patient continues to be ICU status, intubated, and sedated. Prop running at 50mcg/kg/min, LR running at 75 ml/hr, Precedex running at 1 mcg/kg/hr. Attempted to decrease sedation and patient became agitated, so sedation was increased, but lower then at the beginning of shift. Patient did get restless around 0604 and was given PRN Haldol as Prop 20 mg bolus did not settle patient down enough. Patient continues on soft restraints and bed alarm due to safety. CPOT score 0. Appears comfortable.   Major Shift Events: Patient continues to be sedated during shift. No acute changes overnight. Some mild restlessness in the AM. Able to manage with PRN Haldol and Prop bolus of 20 mg.     Plan (Upcoming Events): K+ replaced and Phos currently being replaced per protocols.   Discharge/Transfer Needs: TBD.    Bedside Shift Report Completed : yes  Bedside Safety Check Completed: Yes

## 2021-08-27 NOTE — PROGRESS NOTES
Patient was extubated to 2L NC. Sats were HR 86 SPO2 98 RR 24. Tolerated well will continue to monitor. Strong productive cough and breath sounds are clear

## 2021-08-27 NOTE — PROGRESS NOTES
SPIRITUAL HEALTH SERVICES  SPIRITUAL ASSESSMENT Progress Note  [facility] ([facility]) ICU R      REFERRAL SOURCE: Follow Up    Pt's family discussed some things with Unit . Pt was greeted  By . They stated pt was not Jain at all and was not raised to believe in 'God'. Pt's family mentioned they were praying for pt.  extended prayer for pt from affair.    PLAN: Pt will have to specifically request a  Visit from  in the future.    Kristy Lentz  Associate    Pager: [phone]

## 2021-08-27 NOTE — PLAN OF CARE
ICU End of Shift Summary. See flowsheets for vital signs and detailed assessment.    Changes this shift:   Pt is A/O, calm and cooperative, anxious. Sitter with pt for 1:1 for safety d/t suicide ideation. Able to stand by assist to use the commode. Pt remains in SR with QTs WDL. Pt extubated today at around 1030. Currently sats at mid 90s on room air, LS are clear. Diet advanced to regular and tolerating well. Aspen removed this PM.     Plan:   Continue to monitor.    Problem: Adult Inpatient Plan of Care  Goal: Plan of Care Review  Outcome: No Change     Problem: Adult Inpatient Plan of Care  Goal: Patient-Specific Goal (Individualized)  Outcome: No Change     Problem: Adult Inpatient Plan of Care  Goal: Absence of Hospital-Acquired Illness or Injury  Outcome: No Change

## 2021-08-27 NOTE — PROGRESS NOTES
"CLINICAL NUTRITION SERVICES - ASSESSMENT NOTE     Nutrition Prescription    RECOMMENDATIONS FOR MDs/PROVIDERS TO ORDER:  None at this time    Malnutrition Status:    % Intake: </= 50% for >/= 5 days (severe)  % Weight Loss: Up to 5% in 1 month (non-severe)  Subcutaneous Fat Loss: Unable to assess- pt just extubated and too sleepy  Muscle Loss: Unable to assess  Fluid Accumulation/Edema: None noted  Malnutrition Diagnosis: Non-severe malnutrition in the context of acute illness    Recommendations already ordered by Registered Dietitian (RD):  None at this time d/t NPO status    Future/Additional Recommendations:  Monitor diet start, add supplements as able/necessary     REASON FOR ASSESSMENT  Lizz Polanco is a/an 46 year old female assessed by the dietitian for MST score of 2 d/t \"unsure\" of possible weight loss.  Pt presents d/t intentional polysubstance overdose. PMH significant for depression.    NUTRITION HISTORY  - Information obtained from pt's boyfriend and family member  - Unable to obtain nutrition history from patient directly--pt just extubated and very sleepy from recently d/c'd propofol and ongoing precedex  - Per patient's boyfriend, pt has been eating very poorly for several days. He estimates intake at <50% of usual for several days (>5). Also noted that patient reported a 7 lb weight loss recently which is consistent with records.  - Unable to assess allergies, no known allergies per Care Everywhere    CURRENT NUTRITION ORDERS  Diet: NPO    LABS  Labs reviewed    Electrolytes  Sodium (mmol/L)   Date Value   08/27/2021 139   12/19/2012 136     Potassium (mmol/L)   Date Value   08/27/2021 3.7   12/19/2012 3.7     Magnesium (mg/dL)   Date Value   08/27/2021 2.3     Phosphorus (mg/dL)   Date Value   08/27/2021 2.7    Renal  Urea Nitrogen (mg/dL)   Date Value   08/27/2021 7   12/19/2012 13     Creatinine (mg/dL)   Date Value   08/27/2021 0.60   12/19/2012 0.58     Inflammatory Markers  WBC (10e9/L) " "  Date Value   12/19/2012 8.8     WBC Count (10e3/uL)   Date Value   08/27/2021 8.9     Albumin (g/dL)   Date Value   08/27/2021 3.0 (L)      Blood Glucose  Glucose (mg/dL)   Date Value   08/27/2021 102 (H)   12/19/2012 123 (H)     GLUCOSE BY METER POCT (mg/dL)   Date Value   08/27/2021 105 (H)     Hemoglobin A1C (%)   Date Value   08/25/2021 5.3    Hepatic  ALT (U/L)   Date Value   08/27/2021 12     AST (U/L)   Date Value   08/27/2021 12     Alkaline Phosphatase (U/L)   Date Value   08/27/2021 58     Bilirubin Total (mg/dL)   Date Value   08/27/2021 0.3    Additional  Ketones Urine (mg/dL)   Date Value   08/25/2021 Negative            MEDICATIONS    enoxaparin ANTICOAGULANT  30 mg Subcutaneous Q24H     nicotine  1 patch Transdermal Daily     nicotine   Transdermal Q8H     pantoprazole (PROTONIX) IV  40 mg Intravenous BID     sodium phosphate  9 mmol Intravenous Once        dexmedetomidine 1 mcg/kg/hr (08/27/21 0800)     lactated ringers 75 mL/hr at 08/27/21 0800     propofol (DIPRIVAN) infusion 50 mcg/kg/min (08/27/21 0800)    And     - MEDICATION INSTRUCTIONS -          ANTHROPOMETRICS  Height: 148.6 cm (4' 10.504\")  Most Recent Weight: 53.3 kg (117 lb 8.1 oz)    IBW: 40.9 kg  Body mass index is 24.14 kg/m .  BMI: Normal BMI  Weight History:   Wt Readings from Last 10 Encounters:   08/25/21 53.3 kg (117 lb 8.1 oz)   08/25/21 58 kg (127 lb 13.9 oz)   08/16/21 53.7 kg (118 lb 6.4 oz)   07/26/21 55.6 kg (122 lb 9.6 oz)   03/16/21 55.7 kg (122 lb 11.2 oz)   03/09/20 55.6 kg (122 lb 8 oz)   12/19/12 55.9 kg (123 lb 3 oz)     2.3 kg (4%) weight loss over 1 month    Dosing Weight: 53.3 kg    ASSESSED NUTRITION NEEDS  Estimated Energy Needs: 8071-0757 kcals/day (25 - 30 kcals/kg)  Justification: Maintenance  Estimated Protein Needs: 53-64 grams protein/day (1 - 1.2 grams of pro/kg)  Justification: maintenance vs repletion  Estimated Fluid Needs: 0844-5317 mL/day (1 mL/kcal)   Justification: Maintenance    MALNUTRITION  % " Intake: </= 50% for >/= 5 days (severe)  % Weight Loss: Up to 5% in 1 month (non-severe)  Subcutaneous Fat Loss: Unable to assess- pt just extubated and too sleepy  Muscle Loss: Unable to assess  Fluid Accumulation/Edema: None noted  Malnutrition Diagnosis: Non-severe malnutrition in the context of acute illness    NUTRITION DIAGNOSIS  Inadequate protein-energy intake related to worsening depression as evidenced by unintentional weight loss and decreased intake      INTERVENTIONS  Implementation  Nutrition Education: Not appropriate at this time due to patient condition   Monitor diet start, add supplements as able     Goals  Diet adv v nutrition support within 2-3 days.     Monitoring/Evaluation  Progress toward goals will be monitored and evaluated per protocol.  Susana Ledbetter RD, LD  ICU/5A/OB/Mental Health Pager (M-F): [phone]  On Call Pager (weekends only): [phone]

## 2021-08-27 NOTE — PROGRESS NOTES
No acute changes this shift. Pt continues to be sedated and intubated per order. Pt intermittently agitated, pulling at tubes and interfering with care. Placed 1:1 at bedside for safety with soft restraints. No evidence of widened QRS, SR, normal BP. Initiated Precedix per order and titrated to obtain RAAS score. OG on LIS w/o output. PS trial successful. Anticipate extubation on 8/27.  Stefany El RN

## 2021-08-27 NOTE — PROGRESS NOTES
"[facility]    ICU Progress Note       Date of Admission:  8/25/2021    Assessment: Critical Care   Lizz Polanco is a 46 year old female admitted to ICU on 8/25/2021. She presented unresponsive to OSH by ambulance after apparent intentional drug overdose with multiple meds.  Recent history of depression and suicidal thought validated by boyfriend and friend.  Apparently she took multiple meds in a suicide attempt in the morning on day of admission and apologized to her boyfriend after taking the meds.  Medications bottles were empty, Ambien 10 mg 90 tablets (refilled 8/16),  Wellbutrin  mg 30 tablet (refill 8/17), Xanax 0.25 mg 60 tablets (8/7) and Zanaflex 4 mg 90 tablets (filled July).  Recent history of ibuprofen use for ongoing pain    Sedation was weaned and patient extubated this morning.  Author had a lengthy conversation with patient this afternoon after the patient had time to recover and be of sound mind.  She stated that she \"still does not want to live\" but does not currently have suicidal or homicidal ideations.  She is willing to speak with psychology (already consulted) within the next 24 hours for evaluation and recommendations.  Patient also stated that she took 88 Ambien on 8/25 but does not recall the exact amount of Wellbutrin XL, Xanax, or Zanaflex.  Social Work is consulted in order to assist in evaluation and to determine proper NOK to make medical decisions on her behalf if needed.  Patient also expressed that she would like to remain in full code status.    Changes and Major Things Today  - Wean Precedex as tolerated  - Haldol PRN  - Wean propofol  - PS trials, now extubated to room air  - Remove OG tube  - Plan to remove sanchez catheter if patient cooperating  - Plan to advance diet as tolerating pending bedside swallow  - Holding PTA medications for now       Neuro/ pain/ sedation:  # Poly drug overdose  # Acute CNS depression, " resolved  # Suicidal Ideation  # Acute aggitation, improving  # Encephalopathy, improving  Concern for patient taking an unknown amount of Ambien 10 mg 90 tablets (refilled 8/16),  Wellbutrin  mg 30 tablet (refill 8/17), Xanax 0.25 mg 60 tablets (8/7) and Zanaflex 4 mg 90 tablets (filled July).  Recent history of ibuprofen use for ongoing pain.  Per boyfriend, patient's anxiety and suicidal ideation has been increasing over the past few weeks; her coping mechanism tends to be self harm.  Boyfriend states that he is unaware of suicidal ideation beyond the previous two weeks.   OSH labs included acetaminophen level, ethanol, and salicylate level, lactic acid; all negative.  Urine drug screen panel (OSH) positive for cannabinoids and benzodiazepines.  Per chart, patient received propofol 100mg x 2 boluses + gtt and versed bolus for agitation in the ED.       - Haldol PRN for agitation  - Wean propofol gtt as tolerated.  CK and triglycerides per protocol.  - Wean Precedex as tolerated.  Prefer to wean propofol first then precedex as needed.  - Bedside attendant for now  - No restraints for now as patient is without agitation, pleasant, and without suicidal nor homicidal ideation  - Monitor neurological status to include risk for siezures. Notify provider for any acute changes in exam  - Psychiatry consult placed, appreciate recs  Consulted with poison control 8/25 and 8/26, appreciate the recommendations.               -  Recommendations: Continue with either propofol and/or benzo as risk of seizure is still present given patient tachycardia, agitation, and risk can extend just beyond the 24 hour point of Wellbutrin overdose.  ICU will re-evaluate the need for propofol use tomorrow morning   -Wellbutrin XR is the most concerning medication ingested considering high risk of seizures, tachycardia, arhythmia, QRS widening.  Effects of Wellbutrin XR can last for 18-24 hours.                -Treat any seizures with  "ativan. Consider versed if severe              - treat QRS >120 with bicarb bolus + bicarb gtt.  If QTc >500 treat with Mg bolus.  Maintain high replacement protocols for potassium and magnesium.                -Daily EKGs.  No fentanyl use as this is contraindicated.              - Use caution in treating hypertension as Wellbutrin XR can often cause hypotension later in metabolism.     # Tobacco use  Per boyfriend, Hank, patient smokes 1 pack per day, unknown length of history  - Nicoderm patch starting tomorrow morning     # Alcohol abuse disorder  Per patient's boyfriend, she does not consistently consume alcohol; however, she will \"binge on 2-3 drinks on occasion when stressed\"  - Ativan prn for anxiety  - no indication for CIWA protocol at this time given PMH     Pulmonary:  # Acute respiratory hypoxia, resolved  # Airway compromise, s/p mechanical ventilation, resolved  At ED OSH the ETT entered the stomach during initial intubation, gastric content was present and suctioned. Tube was removed, rocuronium administered, second attempt successful  -Room air, stable  - supplemental oxygen if required to keep saturation about 92%      Cardiovascular:  # Acute hypotension, resolved  # Hypertension  Hypertension source likely due to agitation with residual effects from Wellbutrin XR overdose   - Permissive hypertension <180 systolic.  Per Poison Control, use caution in treating blood pressure as often times overdose on Wellbutrin can cause hypotension later in the course  -  - Maintain MAP >65, systolic >90  - Repeat EKG in the morning   - Consider sodium bicarb if QRS >120  - Atropine for bradycardia  - Monitor hemodynamic status.     GI/Nutrition:  Unknown gastric content through NG suction during 8/25 and 8/26 was brown-red in color, coffee grounds appearance. Improvement today, output 250 mL.     - Monitor hemoglobin, on PPI  - Remove OG  - Ok for clear liquids pending bedside swallow     Renal/ Fluid " Balance:   # Primary metabolic alkalosis with secondary respiratory acidosis  UA from OSH with protein and RBC.  - Will monitor intake and output   - High replacement protocol for Potassium; K+ >4  - High replacement protocol for Magnesium; Mg >2  - Daily BMP and Mg  - LR @ 75mL/hr for IV fluid hydration, consider discontinue if PO intake is adequate     Endocrine:  No active concerns  - Monitoring for hypoglycemia per protocol     ID:  # Concern for aspiration in the setting of esophageal intubation  Patient stable with no current signs of infection  - Will continue to monitor respiratory status, Tmax, and leukocytosis     Hematology:  No acute concerns  - Daily CBC     MSK:   # Left hand and distal arm edema  Edema noted this morning on exam to the left hand and distal arm circumferentially; IV fluid infiltration vs constricting restrains.    - Removed restraints, removed peripheral IV at the side of edema, will continue to monitor for stability and adequate perfusion.  - OOB with assist as tolerated     Lines/ tubes/ drains: R arm PIV, L arm PIV  Loiuse Catheter: PRESENT for strict I/O.  Plan to remove today with bladder scans  Central Lines: None     Prophylaxis:  - DVT Prophylaxis: Lovenox, Pneumatic Compression Devices, OOB as tolerated  - PUD Prophylaxis: PPI     Next of Kin: Patient's mother lives in [place] but is unable to visit as result of the  having COVID-19.  Patient stated this afternoon that she does not want her mom to make medical decisions for her in the future. She is estranged from her Father.   Patient endorsed that her adult son is a current heroine addict.  Her 2 teenaged children  live with her ex-.  Patient's boyfriend and friend Dana have been at bedside daily.     Code Status:  Full Code     Disposition:  - ICU, critical condition     The patient's care was discussed with the Attending Physician, Dr. Torres.  Critical care time exclusive of procedures:  55  minutes    Elie Teague PA-C  [facility]  Securely message with the ioSafe Web Console (learn more here)  Text page via [facility] Paging/Directory      ______________________________________________________________________    Interval History    No acute events overnight.  Patient synchronous with the vent and adequately sedated to  RASS  -2, occasional restlessness.  Weaned sedation.  Extubated approximately 1015 this morning to room air, stable.  Patient appears tired but pleasant and cooperative      Physical Exam   Vital Signs: Temp: 97.3  F (36.3  C) Temp src: Axillary BP: (!) 132/91 Pulse: 61   Resp: (!) 7 SpO2: 99 % O2 Device: Mechanical Ventilator    Weight: 117 lbs 8.08 oz    General: appears tired, pleasant, NAD  HEENT: PERRL, normocephalic/atraumatic, anicteric sclera  NEURO:  AOx3, follows all commands, moves all limbs, no evidence of focal deficits  PULM: Bilateral breathsounds clear to auscultatilon without rhonchi, wheezing, or crackles, bilateral rise of the chest  CV: RR, no M/G/R  ABDOMEN: Normoactive bowel sounds, soft, non-distended, no guarding  : sanchez in place, urine clear and yellow  MUSK: edema to the right hand and distal arm circumferentially and is  warm to palpation, radial pulse appreciable 1+, adequate capillary refill; otherwise all other extremities with no evidence of edema, well perfused, warm to palpation, pedal pulses appreciated    Data   I reviewed all medications, new labs and imaging results over the last 24 hours.  Arterial Blood Gases   No lab results found in last 7 days.    Complete Blood Count   Recent Labs   Lab 08/27/21  0421 08/26/21  1352 08/26/21  0442 08/25/21  1705 08/25/21  0919   WBC 8.9  --  10.1 11.7* 9.2   HGB 12.5 13.1 13.2 12.5 13.7     --  242 226 286       Basic Metabolic Panel  Recent Labs   Lab 08/27/21  0807 08/27/21  0421 08/27/21  0157 08/26/21  1937 08/26/21  1901 08/26/21  1352 08/26/21  0442  08/25/21 1705   NA  --  139  --   --   --  139 139 142   POTASSIUM  --  3.7  --   --  4.0 3.5 3.4 3.0*   CHLORIDE  --  108  --   --   --  109 109 113*   CO2  --  24  --   --   --  25 26 26   BUN  --  7  --   --   --  4* 5* 8   CR  --  0.60  --   --   --  0.60 0.59 0.72   * 102* 102* 106*  --  111* 104* 80       Liver Function Tests  Recent Labs   Lab 08/27/21  0421 08/26/21 0442 08/25/21  1705 08/25/21  0934 08/25/21  0919   AST 12 17 16  --  14   ALT 12 14 14  --  14   ALKPHOS 58 64 55  --  62   BILITOTAL 0.3 0.5 0.6  --  0.6   ALBUMIN 3.0* 3.4 3.2*  --  3.9   INR  --  1.04  --  1.08  --        Pancreatic Enzymes  No lab results found in last 7 days.    Coagulation Profile  Recent Labs   Lab 08/26/21 0442 08/25/21  0934   INR 1.04 1.08       IMAGING:  No results found for this or any previous visit (from the past 24 hour(s)).

## 2021-08-28 ENCOUNTER — TELEPHONE (OUTPATIENT)
Dept: BEHAVIORAL HEALTH | Facility: CLINIC | Age: 46
End: 2021-08-28

## 2021-08-28 LAB
ALBUMIN SERPL-MCNC: 3.2 G/DL (ref 3.4–5)
ALP SERPL-CCNC: 63 U/L (ref 40–150)
ALT SERPL W P-5'-P-CCNC: 14 U/L (ref 0–50)
ANION GAP SERPL CALCULATED.3IONS-SCNC: 6 MMOL/L (ref 3–14)
AST SERPL W P-5'-P-CCNC: 18 U/L (ref 0–45)
ATRIAL RATE - MUSE: 57 BPM
ATRIAL RATE - MUSE: 82 BPM
ATRIAL RATE - MUSE: 96 BPM
BILIRUB SERPL-MCNC: 0.6 MG/DL (ref 0.2–1.3)
BUN SERPL-MCNC: 6 MG/DL (ref 7–30)
CALCIUM SERPL-MCNC: 8.5 MG/DL (ref 8.5–10.1)
CHLORIDE BLD-SCNC: 105 MMOL/L (ref 94–109)
CO2 SERPL-SCNC: 25 MMOL/L (ref 20–32)
CREAT SERPL-MCNC: 0.57 MG/DL (ref 0.52–1.04)
DIASTOLIC BLOOD PRESSURE - MUSE: NORMAL MMHG
ERYTHROCYTE [DISTWIDTH] IN BLOOD BY AUTOMATED COUNT: 11.9 % (ref 10–15)
GFR SERPL CREATININE-BSD FRML MDRD: >90 ML/MIN/1.73M2
GLUCOSE BLD-MCNC: 87 MG/DL (ref 70–99)
GLUCOSE BLDC GLUCOMTR-MCNC: 107 MG/DL (ref 70–99)
GLUCOSE BLDC GLUCOMTR-MCNC: 136 MG/DL (ref 70–99)
HCT VFR BLD AUTO: 39.7 % (ref 35–47)
HGB BLD-MCNC: 13.4 G/DL (ref 11.7–15.7)
INTERPRETATION ECG - MUSE: NORMAL
MAGNESIUM SERPL-MCNC: 2.1 MG/DL (ref 1.6–2.3)
MCH RBC QN AUTO: 30.9 PG (ref 26.5–33)
MCHC RBC AUTO-ENTMCNC: 33.8 G/DL (ref 31.5–36.5)
MCV RBC AUTO: 92 FL (ref 78–100)
P AXIS - MUSE: 50 DEGREES
P AXIS - MUSE: 56 DEGREES
P AXIS - MUSE: 57 DEGREES
PHOSPHATE SERPL-MCNC: 3.4 MG/DL (ref 2.5–4.5)
PLATELET # BLD AUTO: 227 10E3/UL (ref 150–450)
POTASSIUM BLD-SCNC: 3.8 MMOL/L (ref 3.4–5.3)
PR INTERVAL - MUSE: 150 MS
PR INTERVAL - MUSE: 152 MS
PR INTERVAL - MUSE: 158 MS
PROT SERPL-MCNC: 6.7 G/DL (ref 6.8–8.8)
QRS DURATION - MUSE: 68 MS
QRS DURATION - MUSE: 72 MS
QRS DURATION - MUSE: 72 MS
QT - MUSE: 358 MS
QT - MUSE: 374 MS
QT - MUSE: 468 MS
QTC - MUSE: 436 MS
QTC - MUSE: 452 MS
QTC - MUSE: 455 MS
R AXIS - MUSE: 18 DEGREES
R AXIS - MUSE: 24 DEGREES
R AXIS - MUSE: 5 DEGREES
RBC # BLD AUTO: 4.34 10E6/UL (ref 3.8–5.2)
SODIUM SERPL-SCNC: 136 MMOL/L (ref 133–144)
SYSTOLIC BLOOD PRESSURE - MUSE: NORMAL MMHG
T AXIS - MUSE: 22 DEGREES
T AXIS - MUSE: 26 DEGREES
T AXIS - MUSE: 50 DEGREES
VENTRICULAR RATE- MUSE: 57 BPM
VENTRICULAR RATE- MUSE: 82 BPM
VENTRICULAR RATE- MUSE: 96 BPM
WBC # BLD AUTO: 10.5 10E3/UL (ref 4–11)

## 2021-08-28 PROCEDURE — 93005 ELECTROCARDIOGRAM TRACING: CPT

## 2021-08-28 PROCEDURE — 85041 AUTOMATED RBC COUNT: CPT | Performed by: PHYSICIAN ASSISTANT

## 2021-08-28 PROCEDURE — 99253 IP/OBS CNSLTJ NEW/EST LOW 45: CPT | Performed by: PSYCHIATRY & NEUROLOGY

## 2021-08-28 PROCEDURE — 250N000011 HC RX IP 250 OP 636: Performed by: NURSE PRACTITIONER

## 2021-08-28 PROCEDURE — 250N000011 HC RX IP 250 OP 636: Performed by: PHYSICIAN ASSISTANT

## 2021-08-28 PROCEDURE — 250N000013 HC RX MED GY IP 250 OP 250 PS 637: Performed by: NURSE PRACTITIONER

## 2021-08-28 PROCEDURE — 250N000013 HC RX MED GY IP 250 OP 250 PS 637: Performed by: PHYSICIAN ASSISTANT

## 2021-08-28 PROCEDURE — 84100 ASSAY OF PHOSPHORUS: CPT | Performed by: PHYSICIAN ASSISTANT

## 2021-08-28 PROCEDURE — 36415 COLL VENOUS BLD VENIPUNCTURE: CPT | Performed by: PHYSICIAN ASSISTANT

## 2021-08-28 PROCEDURE — C9113 INJ PANTOPRAZOLE SODIUM, VIA: HCPCS | Performed by: NURSE PRACTITIONER

## 2021-08-28 PROCEDURE — 93010 ELECTROCARDIOGRAM REPORT: CPT | Performed by: INTERNAL MEDICINE

## 2021-08-28 PROCEDURE — 200N000002 HC R&B ICU UMMC

## 2021-08-28 PROCEDURE — 83735 ASSAY OF MAGNESIUM: CPT | Performed by: PHYSICIAN ASSISTANT

## 2021-08-28 PROCEDURE — 250N000013 HC RX MED GY IP 250 OP 250 PS 637: Performed by: SURGERY

## 2021-08-28 PROCEDURE — 82040 ASSAY OF SERUM ALBUMIN: CPT | Performed by: PHYSICIAN ASSISTANT

## 2021-08-28 RX ORDER — OXYBUTYNIN CHLORIDE 10 MG/1
10 TABLET, EXTENDED RELEASE ORAL DAILY
Status: DISCONTINUED | OUTPATIENT
Start: 2021-08-28 | End: 2021-08-29 | Stop reason: HOSPADM

## 2021-08-28 RX ORDER — ACETAMINOPHEN 325 MG/1
325 TABLET ORAL EVERY 4 HOURS PRN
Status: DISCONTINUED | OUTPATIENT
Start: 2021-08-28 | End: 2021-08-29 | Stop reason: HOSPADM

## 2021-08-28 RX ORDER — POTASSIUM CHLORIDE 20MEQ/15ML
10 LIQUID (ML) ORAL ONCE
Status: COMPLETED | OUTPATIENT
Start: 2021-08-28 | End: 2021-08-28

## 2021-08-28 RX ORDER — GUAIFENESIN/DEXTROMETHORPHAN 100-10MG/5
5 SYRUP ORAL EVERY 4 HOURS PRN
Status: DISCONTINUED | OUTPATIENT
Start: 2021-08-28 | End: 2021-08-29 | Stop reason: HOSPADM

## 2021-08-28 RX ORDER — HYDROXYZINE HYDROCHLORIDE 25 MG/1
25 TABLET, FILM COATED ORAL EVERY 6 HOURS PRN
Status: DISCONTINUED | OUTPATIENT
Start: 2021-08-28 | End: 2021-08-29 | Stop reason: HOSPADM

## 2021-08-28 RX ORDER — TRAZODONE HYDROCHLORIDE 50 MG/1
50 TABLET, FILM COATED ORAL
Status: DISCONTINUED | OUTPATIENT
Start: 2021-08-28 | End: 2021-08-29

## 2021-08-28 RX ADMIN — ENOXAPARIN SODIUM 30 MG: 30 INJECTION SUBCUTANEOUS at 20:34

## 2021-08-28 RX ADMIN — OXYBUTYNIN CHLORIDE 10 MG: 10 TABLET, FILM COATED, EXTENDED RELEASE ORAL at 15:12

## 2021-08-28 RX ADMIN — POTASSIUM CHLORIDE 10 MEQ: 40 SOLUTION ORAL at 07:30

## 2021-08-28 RX ADMIN — GUAIFENESIN AND DEXTROMETHORPHAN 5 ML: 100; 10 SYRUP ORAL at 02:08

## 2021-08-28 RX ADMIN — GUAIFENESIN AND DEXTROMETHORPHAN 5 ML: 100; 10 SYRUP ORAL at 20:34

## 2021-08-28 RX ADMIN — NICOTINE 7 MG/24 HR DAILY TRANSDERMAL PATCH 1 PATCH: at 07:31

## 2021-08-28 RX ADMIN — TRAZODONE HYDROCHLORIDE 50 MG: 50 TABLET ORAL at 20:34

## 2021-08-28 RX ADMIN — PANTOPRAZOLE SODIUM 40 MG: 40 INJECTION, POWDER, FOR SOLUTION INTRAVENOUS at 07:30

## 2021-08-28 RX ADMIN — LORAZEPAM 1 MG: 0.5 TABLET ORAL at 00:18

## 2021-08-28 NOTE — CONSULTS
Care Management Follow Up    Length of Stay (days): 3    Expected Discharge Date: TBD-possible psych admission     Concerns to be Addressed:     SW consulted to establish NOK  Patient plan of care discussed at interdisciplinary rounds: No    Anticipated Discharge Disposition:  Possible psych admission, pt voluntary at this time     Additional Information:  Pt was admitted to inpatient on 8/25/21 due to intentional drug overdose, was intubed upon admission and successfully extubated yesterday (8/27).  SW had been consulted while pt intubated to establish NOK.  Now that pt is extubated, SW was able to meet with pt to discuss her wishes for medical decision making should she be unable to make her own medical decisions again in the future.  Pt explained that she has an adult son who is 26 yrs old but he is a heroin addict and would not be appropriate for medical decision making.  Her mom and dad are living but she would not want them to make decisions for her either.  She wants her significant other: Hank Amin to make medical decisions, they are not legally .      ANTHONY assisted pt in completing a HCD short form naming Hank as her health care agent.  Original and copy given to pt and she will give to Hank.  Copy forwarded to [facility] to be scanned into chart.  Copy placed in hard cover chart and bedside and charge RN updated.      Awaiting psych to see for recs.    Please contact ANTHONY if additional needs arise.    Claribel Jain [facility]  Weekend  [phone]      Claribel Jain [facility]

## 2021-08-28 NOTE — PLAN OF CARE
ICU End of Shift Summary. See flowsheets for vital signs and detailed assessment.    Changes this shift:   Pt is A/O, calm and cooperative. Sitter discontinued per phychiatry. Seizure precautions discontinued. Able to stand by assist to use the bathroom. Pt remains in SR with QTs WDL. Currently sats at mid 90s on room air, LS are clear.  visited and made new advanced directives, see SW note.    Plan:   Pending [facility] visit and possible discharge afterwards.    Problem: Adult Inpatient Plan of Care  Goal: Plan of Care Review  Outcome: No Change     Problem: Adult Inpatient Plan of Care  Goal: Patient-Specific Goal (Individualized)  Outcome: No Change     Problem: Adult Inpatient Plan of Care  Goal: Absence of Hospital-Acquired Illness or Injury  Outcome: No Change

## 2021-08-28 NOTE — TELEPHONE ENCOUNTER
"Patient cleared and ready for behavioral bed placement: Yes   S: Received call from Thania Celestin regarding 46/F on 10A, #[phone][facility].     B: Pt came in post SA on 8/25 via internatal ingestion of what was suspected other missing pill bottles around 08:20: 90, 10mg of Ambien; 30, 150 mg Wellbutrin XL, 60, 0.25mg Xanax and 90, 4mg Zanaflex. Pt's boyfriend found her unresponsive. Pt had to be intubated initially and admitted to [facility]. Pt is currently medically cleared. Pt has a hx of anxiety and reports she had SI for 2 weeks prior to overdose. Pt informed her 1:1 staff that she \"doesn't want to live anymore\" and she doesn't know if she can keep herself safe if she were to discharge home. Pt contracts for safety in the hospital. Pt denies psychotic symptoms and no reports of aggressive behaviors. Pt calm and cooperative. No hx of acute medical concerns. Pt ambulates, eats, drinks ok. Covid test negative.    A: Voluntary, but holdable    Doc-Doc: Hospitalist Thania Celestin pager #: [phone]    R: Pt placed on work list pending appropriate placement availability    "
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
## 2021-08-28 NOTE — PROGRESS NOTES
Major Shift Events:  AOx4, afebrile, sinus rhythm, sinus tachy at 100-105 when anxious, Denies chest pain, had been asking for sleeping medication  CHRIS on duty was aware started on melatonin, Standby assist in ambulation, on 1:1 due to suicidal ideation, LSC, able to urinate with no concerns. Hoarse voice r/t s/p extubation today. Patient had complain of pain on her left arm tolerable and right arm due to IV infiltration, Currently with 1 IV site patent, CHRIS was amenable for 1 IV site since patient don't have much IV medications.  Plan: Psych consult in am         : Safety checks         : Hemodynamic monitoring      For vital signs and complete assessments, please see documentation flowsheets.

## 2021-08-28 NOTE — PROGRESS NOTES
[facility] [place]  08/28/[place]21    ICU STAFF    Pleasant 46F admitted after ingesting multiple medications in a suicide attempt.  Initially intubated, now extubated and stable.  Awaits voluntary  commitment.  Spouse present and appears to be supportive.    Reviewed and visited with Ms. Celestin.  Agree with data below.    Saulo Lara MD      Date of Service (when I saw the patient): 08/28/2021    ASSESSMENT: Lizz Polanco is a 46 year old female with PMH Psoriasis, Anxiety, Insomnia, Chronic back & neck pain, tubal ligation who was admitted to ICU on 8/25/2021 with polydrug overdose (Wellbutrin  mg 30 tablet (refill 8/17), Xanax 0.25 mg 60 tablets (8/7) and Zanaflex 4 mg 90 tablets-filled July). She presented unresponsive to OSH by ambulance after apparent intentional drug overdose with multiple meds.  Recent history of depression and suicidal thought validated by boyfriend and friend.  Apparently she took multiple meds in a suicide attempt in the morning on day of admission and apologized to her boyfriend after taking the meds.    CHANGES and MAJOR THINGS TODAY:   - Psych consultation  - Remove seizure precautions  - Regular diet  - Continue bedside attendant  - Continue to hold PTA medications until psych eval  - Discontinue Haldol  - Tylenol PRN  - Discontinue PPI  - Discontinue sanchez catheter, bladder scan/straight cath per ICU routine   - Resume pta Oxybutinin    PLAN:    Neuro:  # Poly drug overdose  # Suicidal Ideation  Concern for patient taking an unknown amount of Ambien 10 mg 90 tablets (refilled 8/16),  Wellbutrin  mg 30 tablet (refill 8/17), Xanax 0.25 mg 60 tablets (8/7) and Zanaflex 4 mg 90 tablets (filled July).  Recent history of ibuprofen use for ongoing pain.  Per boyfriend, patient's anxiety and suicidal ideation has been increasing over the past few weeks; her coping mechanism tends to be self harm.  Boyfriend states that he is unaware of suicidal ideation beyond  "the previous two weeks.   OSH labs included acetaminophen level, ethanol, and salicylate level, lactic acid; all negative.  Urine drug screen panel (OSH) positive for cannabinoids and benzodiazepines. Intubated for airway protection, now extubated.  - Haldol PRN discontinued  - Bedside attendant per policy for suicidal ideation and attempt prior to admission  - Monitor neurological status to include risk for siezures. Notify provider for any acute changes in exam  - Psychiatry consult placed, appreciate recs  - Consulted with poison control 8/25 and 8/26, appreciate the recommendations.               -  Recommendations: Continue with either propofol and/or benzo as risk of seizure is still present given patient tachycardia, agitation, and risk can extend just beyond the 24 hour point of Wellbutrin overdose.  ICU will re-evaluate the need for propofol use tomorrow morning              -Wellbutrin XR is the most concerning medication ingested considering high risk of seizures, tachycardia, arhythmia, QRS widening.  Effects of Wellbutrin XR can last for 18-24 hours.                -Treat any seizures with ativan. Consider versed if severe              - treat QRS >120 with bicarb bolus + bicarb gtt.  If QTc >500 treat with Mg bolus.  Maintain high replacement protocols for potassium and magnesium.                -Daily EKGs.  No fentanyl use as this is contraindicated.              - Use caution in treating hypertension as Wellbutrin XR can often cause hypotension later in metabolism.     # Tobacco use  Patient smokes 1 pack per day, unknown length of history  - Nicoderm patch     # Alcohol abuse disorder  Per patient's boyfriend, she does not consistently consume alcohol; however, she will \"binge on 2-3 drinks on occasion when stressed\"  - Ativan prn for anxiety  - no indication for CIWA protocol    # Anxiety  - Holding PTA meds at this time pending psych eval (escitalopram, alprazolam)  - Ativan PRN    # Insomnia  - " Hold pta Ambien  - Melatonin 5 mg PO at bedtime    Pulmonary:  # Acute respiratory hypoxia, resolved  # Airway compromise, s/p mechanical ventilation, resolved  At ED OSH the ETT entered the stomach during initial intubation, gastric content was present and suctioned. Tube was removed, rocuronium administered, second attempt successful, now extubated 8/27  - Room air, stable  - supplemental oxygen if required to keep saturation about 92%  - IS while awake  - OOB to chair TID       Cardiovascular:  # Acute hypotension, resolved  # Hypertension, resolved  Hypertension source likely due to agitation with residual effects from Wellbutrin XR overdose   - Permissive hypertension <180 systolic.  Per Poison Control, use caution in treating blood pressure as often times overdose on Wellbutrin can cause hypotension later in the course.  - Maintain MAP >65, systolic >90, now normotensive  - Repeat EKG in the morning   - Consider sodium bicarb if QRS >120  - Atropine for bradycardia  - Monitor hemodynamic status  - Telemetry monitoring while in ICU    GI/Nutrition:  # At risk for malnutrition  - Regular diet  - No further indication for PPI, stopped    Renal/Fluids/Electrolytes:  # Urinary incontinence  - Resume PTA Oyxbutynin ER  - Remove sanchez catheter, straight cath & bladder scan per unit routine    # Primary metabolic alkalosis with secondary respiratory acidosis  UA from OSH with protein and RBC, resolved  - Monitor I/O  - Replete electrolytes per ICU protocol  - Daily BMP and Mg, PO  - Daily weights    Endocrine:  # No active issues    ID:  # Concern for aspiration in the setting of esophageal intubation  Patient stable with no current signs of infection. CXR 8/25 not concerning for infiltrates or infectious process.  - Monitor CBC daily  - Monitor vital signs  - Pan culture if concerns for infection    Hematology:    # No acute concerns     Musculoskeletal:  # Left hand and distal arm edema, resolved with removal of  "restraints.   # Hx of ganglion cyst  - Continue to monitor    Skin:  # No acute concerns      General Cares/Prophylaxis:    DVT Prophylaxis: Enoxaparin (Lovenox) SQ  GI Prophylaxis: Not indicated  Restraints: N/A at this time  Family Communication: Hank (significant other) present at bedside  Code Status: Full Code    Next of kin:  Patient's mother lives in [place] but is unable to visit as result of the  having COVID-19.  Patient stated this afternoon that she does not want her mom to make medical decisions for her in the future. She is estranged from her Father.   Patient endorsed that her adult son is a current heroine addict.  Her 2 teenaged children  live with her ex-.  Patient's boyfriend and friend Dana have been at bedside daily.    Lines/tubes/drains:  - PIV x2    Disposition:  - Medical ICU     Patient seen and findings/plan discussed with medical ICU staff, Dr. Lara    Time Spent on this Encounter   Lizz was seen and evaluated by me on 08/28/21.  She was in critical condition as the result of intentional overdose.    Her condition is now Stable.      Total Critical Care time spent by me, excluding procedures, was 30 minutes.    Laura Celestin, CNP, APRN    ====================================  INTERVAL HISTORY:   Nursing notes reviewed. No acute overnight events. Pt. Denies suicidal ideation this am however states she is \"unsure\" if she is really feeling this way. She is calm and interactive however appears depressed and flat during interactions.    OBJECTIVE:   1. VITAL SIGNS:   Temp:  [97.5  F (36.4  C)-98.6  F (37  C)] 98.5  F (36.9  C)  Pulse:  [] 97  Resp:  [0-35] 12  BP: (100-134)/(60-91) 134/85  SpO2:  [92 %-100 %] 96 %  Ventilation Mode: CPAP/PS  (Continuous positive airway pressure with Pressure Support)  FiO2 (%): 25 %  Rate Set (breaths/minute): 12 breaths/min  Tidal Volume Set (mL): 400 mL  PEEP (cm H2O): 5 cmH2O  Pressure Support (cm H2O): 7 cmH2O  Oxygen " Concentration (%): 25 %  Resp: 12    2. INTAKE/ OUTPUT:   I/O last 3 completed shifts:  In: 805.92 [I.V.:805.92]  Out: 1025 [Urine:1025]    3. PHYSICAL EXAMINATION:  General: NAD, depressed, flat affect  HEENT: NCAT, PERRLA, mucous membranes dry  Neuro: No focal deficit, no tremor, rigidity, flaccidity, A&O x3, follows commands  Pulm/Resp: Clear breath sounds bilaterally without rhonchi, crackles or wheeze, breathing non-labored, room air  CV: RRR, S1/S2, no murmurs, rubs, or gallops  Abdomen: Soft, non-distended, non-tender, rounded, bowel sounds +4/4 quadrants  : No sanchez catheter in place, urine not seen  Incisions/Skin: warm, dry, no rashes or concerning lesions, extremities w/o deformity or edema    4. LABS:   Arterial Blood Gases   No lab results found in last 7 days.  Complete Blood Count   Recent Labs   Lab 08/28/21 0541 08/27/21  0421 08/26/21  1352 08/26/21  0442 08/25/21  1705   WBC 10.5 8.9  --  10.1 11.7*   HGB 13.4 12.5 13.1 13.2 12.5    208  --  242 226     Basic Metabolic Panel  Recent Labs   Lab 08/28/21  0813 08/28/21  0541 08/27/21  2121 08/27/21  1539 08/27/21  0421 08/26/21  1901 08/26/21  1352 08/26/21  0442   NA  --  136  --   --  139  --  139 139   POTASSIUM  --  3.8  --   --  3.7 4.0 3.5 3.4   CHLORIDE  --  105  --   --  108  --  109 109   CO2  --  25  --   --  24  --  25 26   BUN  --  6*  --   --  7  --  4* 5*   CR  --  0.57  --   --  0.60  --  0.60 0.59   * 87 90 126* 102*  --  111* 104*     Liver Function Tests  Recent Labs   Lab 08/28/21  0541 08/27/21  0421 08/26/21  0442 08/25/21  1705 08/25/21  0934   AST 18 12 17 16  --    ALT 14 12 14 14  --    ALKPHOS 63 58 64 55  --    BILITOTAL 0.6 0.3 0.5 0.6  --    ALBUMIN 3.2* 3.0* 3.4 3.2*  --    INR  --   --  1.04  --  1.08     Coagulation Profile  Recent Labs   Lab 08/26/21  0442 08/25/21  0934   INR 1.04 1.08       5. RADIOLOGY:   No results found for this or any previous visit (from the past 24 hour(s)).

## 2021-08-28 NOTE — PROVIDER NOTIFICATION
Patient complained of inability/difficulty of  sleeping CHRIS on duty was informed Melatonin ordered which was administered. Later patient  complained of having a dry cough encouraged sips of water , CHRIS was notified guaifenesin was administered as ordered.

## 2021-08-28 NOTE — TELEPHONE ENCOUNTER
R:  2:15 PM  Call to [place] [phone] to present for 5N.  Left VMM with patient's mrn and requested a call back asap.

## 2021-08-29 VITALS
TEMPERATURE: 98.2 F | DIASTOLIC BLOOD PRESSURE: 86 MMHG | BODY MASS INDEX: 23.69 KG/M2 | HEART RATE: 95 BPM | SYSTOLIC BLOOD PRESSURE: 123 MMHG | WEIGHT: 117.5 LBS | RESPIRATION RATE: 22 BRPM | HEIGHT: 59 IN | OXYGEN SATURATION: 94 %

## 2021-08-29 LAB
ANION GAP SERPL CALCULATED.3IONS-SCNC: 3 MMOL/L (ref 3–14)
BUN SERPL-MCNC: 3 MG/DL (ref 7–30)
CALCIUM SERPL-MCNC: 8.7 MG/DL (ref 8.5–10.1)
CHLORIDE BLD-SCNC: 106 MMOL/L (ref 94–109)
CO2 SERPL-SCNC: 30 MMOL/L (ref 20–32)
CREAT SERPL-MCNC: 0.58 MG/DL (ref 0.52–1.04)
ERYTHROCYTE [DISTWIDTH] IN BLOOD BY AUTOMATED COUNT: 11.9 % (ref 10–15)
GFR SERPL CREATININE-BSD FRML MDRD: >90 ML/MIN/1.73M2
GLUCOSE BLD-MCNC: 100 MG/DL (ref 70–99)
HCT VFR BLD AUTO: 38.5 % (ref 35–47)
HGB BLD-MCNC: 13.1 G/DL (ref 11.7–15.7)
MAGNESIUM SERPL-MCNC: 2.2 MG/DL (ref 1.6–2.3)
MCH RBC QN AUTO: 30.8 PG (ref 26.5–33)
MCHC RBC AUTO-ENTMCNC: 34 G/DL (ref 31.5–36.5)
MCV RBC AUTO: 90 FL (ref 78–100)
PHOSPHATE SERPL-MCNC: 3.6 MG/DL (ref 2.5–4.5)
PLATELET # BLD AUTO: 241 10E3/UL (ref 150–450)
POTASSIUM BLD-SCNC: 3.5 MMOL/L (ref 3.4–5.3)
POTASSIUM BLD-SCNC: 3.5 MMOL/L (ref 3.4–5.3)
RBC # BLD AUTO: 4.26 10E6/UL (ref 3.8–5.2)
SODIUM SERPL-SCNC: 139 MMOL/L (ref 133–144)
WBC # BLD AUTO: 7.7 10E3/UL (ref 4–11)

## 2021-08-29 PROCEDURE — 250N000013 HC RX MED GY IP 250 OP 250 PS 637: Performed by: NURSE PRACTITIONER

## 2021-08-29 PROCEDURE — 84100 ASSAY OF PHOSPHORUS: CPT | Performed by: SURGERY

## 2021-08-29 PROCEDURE — 85041 AUTOMATED RBC COUNT: CPT | Performed by: NURSE PRACTITIONER

## 2021-08-29 PROCEDURE — 80048 BASIC METABOLIC PNL TOTAL CA: CPT | Performed by: NURSE PRACTITIONER

## 2021-08-29 PROCEDURE — 83735 ASSAY OF MAGNESIUM: CPT | Performed by: SURGERY

## 2021-08-29 PROCEDURE — 36415 COLL VENOUS BLD VENIPUNCTURE: CPT | Performed by: NURSE PRACTITIONER

## 2021-08-29 PROCEDURE — 99238 HOSP IP/OBS DSCHRG MGMT 30/<: CPT | Performed by: NURSE PRACTITIONER

## 2021-08-29 PROCEDURE — 250N000013 HC RX MED GY IP 250 OP 250 PS 637: Performed by: PHYSICIAN ASSISTANT

## 2021-08-29 PROCEDURE — 84132 ASSAY OF SERUM POTASSIUM: CPT | Performed by: SURGERY

## 2021-08-29 RX ORDER — TRAZODONE HYDROCHLORIDE 50 MG/1
100 TABLET, FILM COATED ORAL
Status: DISCONTINUED | OUTPATIENT
Start: 2021-08-29 | End: 2021-08-29 | Stop reason: HOSPADM

## 2021-08-29 RX ORDER — TRAZODONE HYDROCHLORIDE 100 MG/1
100 TABLET ORAL
Qty: 20 TABLET | Refills: 0 | Status: SHIPPED | OUTPATIENT
Start: 2021-08-29

## 2021-08-29 RX ORDER — ESCITALOPRAM OXALATE 5 MG/1
5 TABLET ORAL DAILY
Qty: 10 TABLET | Refills: 0 | Status: SHIPPED | OUTPATIENT
Start: 2021-08-29 | End: 2021-08-29

## 2021-08-29 RX ORDER — HYDROXYZINE HYDROCHLORIDE 25 MG/1
25 TABLET, FILM COATED ORAL EVERY 6 HOURS PRN
Qty: 25 TABLET | Refills: 0 | Status: SHIPPED | OUTPATIENT
Start: 2021-08-29 | End: 2021-09-30

## 2021-08-29 RX ORDER — ACETAMINOPHEN 325 MG/1
325-650 TABLET ORAL EVERY 6 HOURS PRN
Qty: 10 TABLET | Refills: 0 | Status: SHIPPED | OUTPATIENT
Start: 2021-08-29 | End: 2021-08-29

## 2021-08-29 RX ORDER — TRAZODONE HYDROCHLORIDE 100 MG/1
100 TABLET ORAL
Qty: 20 TABLET | Refills: 0 | Status: SHIPPED | OUTPATIENT
Start: 2021-08-29 | End: 2021-08-29

## 2021-08-29 RX ORDER — ESCITALOPRAM OXALATE 5 MG/1
10 TABLET ORAL DAILY
Qty: 10 TABLET | Refills: 0 | Status: SHIPPED | OUTPATIENT
Start: 2021-08-29

## 2021-08-29 RX ORDER — POTASSIUM CHLORIDE 750 MG/1
10 TABLET, EXTENDED RELEASE ORAL ONCE
Status: COMPLETED | OUTPATIENT
Start: 2021-08-29 | End: 2021-08-29

## 2021-08-29 RX ADMIN — GUAIFENESIN AND DEXTROMETHORPHAN 5 ML: 100; 10 SYRUP ORAL at 00:21

## 2021-08-29 RX ADMIN — GUAIFENESIN AND DEXTROMETHORPHAN 5 ML: 100; 10 SYRUP ORAL at 06:15

## 2021-08-29 RX ADMIN — OXYBUTYNIN CHLORIDE 10 MG: 10 TABLET, FILM COATED, EXTENDED RELEASE ORAL at 08:53

## 2021-08-29 RX ADMIN — HYDROXYZINE HYDROCHLORIDE 25 MG: 25 TABLET, FILM COATED ORAL at 01:40

## 2021-08-29 RX ADMIN — POTASSIUM CHLORIDE 10 MEQ: 750 TABLET, EXTENDED RELEASE ORAL at 08:53

## 2021-08-29 RX ADMIN — NICOTINE 7 MG/24 HR DAILY TRANSDERMAL PATCH 1 PATCH: at 08:52

## 2021-08-29 NOTE — PROGRESS NOTES
Safety Plan/Appointments for discharge:     Psychiatry (initial visit in person):     Monday 9/20/2021 12:00 pm   Medication Mgmt - Initial (In-Person) Christophe Brown DNP, CNP,RN   [facility], [facility]   [address], [address] [place]  [phone]      Teletherapy  Tomorrow 8/30/2021 2:00 pm  Claire Hawthorne MA  [facility]    [employer], [facility]   [address], [address]  [place], [place]  [phone]     If I am feeling unsafe or I am in a crisis, I will:   Contact my established care providers   Call the National Suicide Prevention Lifeline: [phone]   Go to the nearest emergency room   Call [phone]     Warning signs that I or other people might notice when a crisis is developing for me: increase depression/anxiety or suicidal thoughts, sleeping too much    Things I am able to do on my own to cope or help me feel better: get ouside    Things that I am able to do with others to cope or help me better: reach out to friends and BF    Things I can use or do for distraction: watch TV, get outside    Changes I can make to support my mental health and wellness: Go into work one day a week, schedule time during week to get outside, create healthy sleeping habits/routine and not sleep as much as I am now.     People in my life that I can ask for help: Best friend/neighbor Violet boyfriend    Your [facility] has a mental health crisis team you can call 24/7: [facility][1] Crisis Line Number: [phone]

## 2021-08-29 NOTE — PROGRESS NOTES
Major Shift Events:  AOx4, afebrile, sinus rhythm, sinus tachy at 100-105 when anxious, Denies chest pain, Standby assist in ambulation, discontinued 1:1 sitter, discontinued lorazepam,melatonin and seizure precaution. LSC, able to urinate with no concerns. Hoarse voice but getting better as per patient. Received PRN trazodone, vistaril and Guaifenesin during NOC shift.  Plan: For Beh. health care provider visit tomorrow.         : Patient agreed for psych intervention to be seen in am         :  V/S Monitoring and safety         : Electrolytes correction and monitoring.  For vital signs and complete assessments, please see documentation flowsheets.

## 2021-08-29 NOTE — DISCHARGE SUMMARY
[facility]  Discharge Summary    ICU STAFF    After review of her behavioral health resources, the patient will discharge today with coping plans and scheduled OP f/u.    She also appears to have supportive local friend and boyfriend along with other resources.    The above reviewed with Ms. Celestin.    Saulo Lara MD          Date of Admission: 8/25/2021  Date of Discharge: 8/29/21.  Attending Physician: Dr. Saulo Lara  Discharging Provider: Thania Celestin CNP  Date of Service (when I saw the patient): 08/29/21    Primary Provider: Cedric Jacob  Primary Care clinic: [address] [place] [address]  Phone: None  Fax number: None     Discharge Diagnoses   Patient Active Problem List    Diagnosis Date Noted    Overdose 08/25/2021     Priority: Medium       Hospital Course    ASSESSMENT: Lizz Polanco is a 46 year old female with PMH Psoriasis, Anxiety, Insomnia, Chronic back & neck pain, tubal ligation who was admitted to ICU on 8/25/2021 with polydrug overdose (Wellbutrin  mg 30 tablet (refill 8/17), Xanax 0.25 mg 60 tablets (8/7) and Zanaflex 4 mg 90 tablets-filled July). She presented unresponsive to OSH by ambulance after apparent intentional drug overdose with multiple meds.  Recent history of depression and suicidal thought validated by boyfriend and friend.  Apparently she took multiple meds in a suicide attempt in the morning on day of admission and apologized to her boyfriend after taking the meds.     CHANGES and MAJOR THINGS TODAY:   - Psych consultation  - Remove seizure precautions  - Regular diet  - Continue bedside attendant  - Continue to hold PTA medications until psych eval  - Discontinue Haldol  - Tylenol PRN  - Discontinue PPI  - Discontinue sanchez catheter, bladder scan/straight cath per ICU routine   - Resume pta Oxybutinin     PLAN:     Neuro:  # Poly drug overdose  # Suicidal Ideation  Concern for patient taking an  unknown amount of Ambien 10 mg 90 tablets (refilled 8/16),  Wellbutrin  mg 30 tablet (refill 8/17), Xanax 0.25 mg 60 tablets (8/7) and Zanaflex 4 mg 90 tablets (filled July).  Recent history of ibuprofen use for ongoing pain.  Per boyfriend, patient's anxiety and suicidal ideation has been increasing over the past few weeks; her coping mechanism tends to be self harm.  Boyfriend states that he is unaware of suicidal ideation beyond the previous two weeks.   OSH labs included acetaminophen level, ethanol, and salicylate level, lactic acid; all negative.  Urine drug screen panel (OSH) positive for cannabinoids and benzodiazepines. Intubated for airway protection, now extubated.  - Haldol PRN discontinued  - Bedside attendant per policy for suicidal ideation and attempt prior to admission  - Monitor neurological status to include risk for siezures. Notify provider for any acute changes in exam  - Psychiatry consult placed, appreciate recs  - Consulted with poison control 8/25 and 8/26, appreciate the recommendations.               -  Recommendations: Continue with either propofol and/or benzo as risk of seizure is still present given patient tachycardia, agitation, and risk can extend just beyond the 24 hour point of Wellbutrin overdose.  ICU will re-evaluate the need for propofol use tomorrow morning              -Wellbutrin XR is the most concerning medication ingested considering high risk of seizures, tachycardia, arhythmia, QRS widening.  Effects of Wellbutrin XR can last for 18-24 hours.                -Treat any seizures with ativan. Consider versed if severe              - treat QRS >120 with bicarb bolus + bicarb gtt.  If QTc >500 treat with Mg bolus.  Maintain high replacement protocols for potassium and magnesium.                -Daily EKGs.  No fentanyl use as this is contraindicated.              - Use caution in treating hypertension as Wellbutrin XR can often cause hypotension later in  "metabolism.     # Tobacco use  Patient smokes 1 pack per day, unknown length of history  - Nicoderm patch     # Alcohol abuse disorder  Per patient's boyfriend, she does not consistently consume alcohol; however, she will \"binge on 2-3 drinks on occasion when stressed\"  - Ativan prn for anxiety  - no indication for CIWA protocol     # Anxiety  - Holding PTA meds at this time pending psych eval (escitalopram, alprazolam)  - Ativan PRN     # Insomnia  - Hold pta Ambien  - Melatonin 5 mg PO at bedtime     Pulmonary:  # Acute respiratory hypoxia, resolved  # Airway compromise, s/p mechanical ventilation, resolved  At ED OSH the ETT entered the stomach during initial intubation, gastric content was present and suctioned. Tube was removed, rocuronium administered, second attempt successful, now extubated 8/27  - Room air, stable  - supplemental oxygen if required to keep saturation about 92%  - IS while awake  - OOB to chair TID        Cardiovascular:  # Acute hypotension, resolved  # Hypertension, resolved  Hypertension source likely due to agitation with residual effects from Wellbutrin XR overdose   - Permissive hypertension <180 systolic.  Per Poison Control, use caution in treating blood pressure as often times overdose on Wellbutrin can cause hypotension later in the course.  - Maintain MAP >65, systolic >90, now normotensive  - Repeat EKG in the morning   - Consider sodium bicarb if QRS >120  - Atropine for bradycardia  - Monitor hemodynamic status  - Telemetry monitoring while in ICU     GI/Nutrition:  # At risk for malnutrition  - Regular diet  - No further indication for PPI, stopped     Renal/Fluids/Electrolytes:  # Urinary incontinence  - Resume PTA Oyxbutynin ER  - Remove sanchez catheter, straight cath & bladder scan per unit routine     # Primary metabolic alkalosis with secondary respiratory acidosis  UA from OSH with protein and RBC, resolved  - Monitor I/O  - Replete electrolytes per ICU protocol  - Daily " BMP and Mg, PO  - Daily weights     Endocrine:  # No active issues     ID:  # Concern for aspiration in the setting of esophageal intubation  Patient stable with no current signs of infection. CXR 8/25 not concerning for infiltrates or infectious process.  - Monitor CBC daily  - Monitor vital signs  - Pan culture if concerns for infection     Hematology:    # No acute concerns      Musculoskeletal:  # Left hand and distal arm edema, resolved with removal of restraints.   # Hx of ganglion cyst  - Continue to monitor     Skin:  # No acute concerns     General Cares/Prophylaxis:    DVT Prophylaxis: Enoxaparin (Lovenox) SQ  GI Prophylaxis: Not indicated  Restraints: N/A at this time  Family Communication: Hank (significant other) present at bedside  Code Status: Full Code    Significant Results and Procedures   DATE: 8/29/21    Code Status   Full Code    SUBJECTIVE:   Lizz Polanco is a 46 year old female with PMH Psoriasis, Anxiety, Insomnia, Chronic back & neck pain, tubal ligation who was admitted to ICU on 8/25/2021 with polydrug overdose (Wellbutrin  mg 30 tablet (refill 8/17), Xanax 0.25 mg 60 tablets (8/7) and Zanaflex 4 mg 90 tablets-filled July). She presented unresponsive to OSH by ambulance after apparent intentional drug overdose with multiple meds.  Recent history of depression and suicidal thought validated by boyfriend and friend.  Apparently she took multiple meds in a suicide attempt in the morning on day of admission and apologized to her boyfriend after taking the meds.    The patient was intubated for airway protection and subsequently extubated without incident on 8/27/21. Poison control was notified at time of admission. Supportive care was provided. Pt. Remained hemodynamically stable while hospitalized. Psychiatry evaluated patient while on in-patient status. Recommendations to discontinue benzodiazepines and Ambien were observed as well as initiation of Trazodone PRN and Hydroxyzine  "PRN. The patient denies suicidal ideation or intention to harm herself.    The patient was discharged to home with follow up instructions per Behavioral Health and Psychiatry as well as following up with PCP.     Physical Exam:  General: NAD, depressed, flat affect  HEENT: NCAT, PERRLA, mucous membranes dry  Neuro: No focal deficit, no tremor, rigidity, flaccidity, A&O x3, follows commands  Pulm/Resp: Clear breath sounds bilaterally without rhonchi, crackles or wheeze, breathing non-labored, room air  CV: RRR, S1/S2, no murmurs, rubs, or gallops  Abdomen: Soft, non-distended, non-tender, rounded, bowel sounds +4/4 quadrants  : No sanchez catheter in place, urine not seen  Incisions/Skin: warm, dry, no rashes or concerning lesions, extremities w/o deformity or edema    Vital signs:  Temp: 98.2  F (36.8  C) Temp src: Oral BP: 123/86 Pulse: 95   Resp: 22 SpO2: 94 % O2 Device: None (Room air) Oxygen Delivery: 2 LPM Height: 148.6 cm (4' 10.5\") Weight: 53.3 kg (117 lb 8.1 oz)  Estimated body mass index is 24.14 kg/m  as calculated from the following:    Height as of this encounter: 1.486 m (4' 10.5\").    Weight as of this encounter: 53.3 kg (117 lb 8.1 oz).    Discharge Disposition   Discharged to home  Condition at discharge: Stable  Discharge VS: Blood pressure 123/86, pulse 95, temperature 98.2  F (36.8  C), temperature source Oral, resp. rate 22, height 1.486 m (4' 10.5\"), weight 53.3 kg (117 lb 8.1 oz), SpO2 94 %, not currently breastfeeding.    Consultations This Hospital Stay   PSYCHOLOGY ADULT IP CONSULT  SOCIAL WORK IP CONSULT  PSYCHIATRY IP CONSULT  CARE MANAGEMENT / SOCIAL WORK IP CONSULT    Discharge Orders      Activity    Your activity upon discharge: activity as tolerated     Reason for your hospital stay    You were hospitalized and treated for intentional ingestion of prescribed medications     Adult [facility] Follow-up and recommended labs and tests    Follow up with primary care provider, Cedric Jacob, " within 7 days to evaluate medication change, follow up, and hospital discharge.  No follow up labs or test are needed.      Appointments on [facility] and/or [facility] (with [facility] or [facility] provider or service). Call [phone] if you haven't heard regarding these appointments within 7 days of discharge.     Full Code     Diet    Follow this diet upon discharge: Orders Placed This Encounter      Advance Diet as Tolerated: Regular Diet Adult     Discharge Medications   Current Discharge Medication List        START taking these medications    Details   hydrOXYzine (ATARAX) 25 MG tablet Take 1 tablet (25 mg) by mouth every 6 hours as needed for anxiety  Qty: 25 tablet, Refills: 0    Associated Diagnoses: Intentional drug overdose, initial encounter (H)      nicotine (NICODERM CQ) 7 MG/24HR 24 hr patch Place 1 patch onto the skin daily  Qty: 7 patch, Refills: 0    Associated Diagnoses: Intentional drug overdose, initial encounter (H)      traZODone (DESYREL) 100 MG tablet Take 1 tablet (100 mg) by mouth nightly as needed for sleep  Qty: 20 tablet, Refills: 0    Associated Diagnoses: Intentional drug overdose, initial encounter (H)           CONTINUE these medications which have CHANGED    Details   escitalopram (LEXAPRO) 5 MG tablet Take 1 tablet (5 mg) by mouth daily  Qty: 10 tablet, Refills: 0    Comments: Start on Tuesday 8/31  Associated Diagnoses: Intentional drug overdose, initial encounter (H)           CONTINUE these medications which have NOT CHANGED    Details   buPROPion (WELLBUTRIN XL) 150 MG 24 hr tablet Take 150 mg by mouth daily      ibuprofen (ADVIL/MOTRIN) 200 MG tablet Take 200-400 mg by mouth every 8 hours as needed (headache)       oxybutynin ER (DITROPAN-XL) 10 MG 24 hr tablet Take 10 mg by mouth daily           STOP taking these medications       ALPRAZolam (XANAX) 0.25 MG tablet Comments:   Reason for Stopping:         tiZANidine (ZANAFLEX) 4 MG tablet Comments:   Reason for Stopping:          zolpidem (AMBIEN) 10 MG tablet Comments:   Reason for Stopping:             Allergies   No Known Allergies  Data   Most Recent 3 CBC's:  Recent Labs   Lab Test 08/29/21  0549 08/28/21  0541 08/27/21  0421   WBC 7.7 10.5 8.9   HGB 13.1 13.4 12.5   MCV 90 92 94    227 208      Most Recent 3 BMP's:  Recent Labs   Lab Test 08/29/21  0549 08/28/21 2038 08/28/21  0813 08/28/21  0541 08/27/21  0421     --   --  136 139   POTASSIUM 3.5  3.5  --   --  3.8 3.7   CHLORIDE 106  --   --  105 108   CO2 30  --   --  25 24   BUN 3*  --   --  6* 7   CR 0.58  --   --  0.57 0.60   ANIONGAP 3  --   --  6 7   KILEY 8.7  --   --  8.5 8.0*   * 136* 107* 87 102*     Most Recent 2 LFT's:  Recent Labs   Lab Test 08/28/21  0541 08/27/21 0421   AST 18 12   ALT 14 12   ALKPHOS 63 58   BILITOTAL 0.6 0.3     Most Recent INR's and Anticoagulation Dosing History:  Anticoagulation Dose History       Recent Dosing and Labs Latest Ref Rng & Units 8/25/2021 8/26/2021    INR 0.86 - 1.14 1.08 1.04          Most Recent 3 Troponin's:No lab results found.  Most Recent 6 Bacteria Isolates From Any Culture (See EPIC Reports for Culture Details):No lab results found.  Most Recent TSH, T4 and A1c Labs:  Recent Labs   Lab Test 08/25/21  1705   A1C 5.3       Time Spent on this Encounter   I, Laura Celestin NP, personally saw the patient today and spent less than or equal to 30 minutes discharging this patient.    We appreciate the opportunity to care for your patient while in the hospital.     Laura Celestin NP

## 2021-08-29 NOTE — DISCHARGE INSTRUCTIONS
Safety Plan/Appointments for discharge:     Psychiatry (initial visit in person):     Monday 9/20/2021 12:00 pm   Medication Mgmt - Initial (In-Person) Christophe Brown DNP, CNP,RN   [facility], [facility]   [address], [address] [place]  [phone]      Teletherapy  Tomorrow 8/30/2021 2:00 pm  Claire Hawthorne MA  [facility]    [employer], [facility]   [address], [address]  [place], [place]  [phone]     If I am feeling unsafe or I am in a crisis, I will:  Contact my established care providers   Call the National Suicide Prevention Lifeline: [phone]   Go to the nearest emergency room   Call [phone]     Warning signs that I or other people might notice when a crisis is developing for me: increase depression/anxiety or suicidal thoughts, sleeping too much    Things I am able to do on my own to cope or help me feel better: get ouside    Things that I am able to do with others to cope or help me better: reach out to friends and BF    Things I can use or do for distraction: watch TV, get outside    Changes I can make to support my mental health and wellness: Go into work one day a week, schedule time during week to get outside, create healthy sleeping habits/routine and not sleep as much as I am now.     People in my life that I can ask for help: Best friend/neighbor Violet boyfriend    Your [facility] has a mental health crisis team you can call 24/7: [facility] Crisis Line Number: [phone]

## 2021-08-29 NOTE — PLAN OF CARE
Patient seen by Behavioral psych.  Recommendations made.  Instructions given.  Patient discharged to home with Boyfriend.  Johanny Perez RN

## 2021-08-29 NOTE — PHARMACY-ADMISSION MEDICATION HISTORY
Admission Medication History Completed by Pharmacy    See [facility] Admission Navigator for allergy information, preferred outpatient pharmacy, prior to admission medications and immunization status.     Medication History Sources:     Patient - Lizz    Dispense Report    Changes made to PTA medication list (reason):    Added: None    Deleted: None    Changed: updated directions for ibuprofen    Additional Information:    Patient is unsure of the last time she took her medications but believes it was last Sunday (8/22/21) or Monday (8/23/21)    Prior to Admission medications    Medication Sig Last Dose Taking? Auth Provider   ALPRAZolam (XANAX) 0.25 MG tablet Take 0.25 mg by mouth 3 times daily as needed for anxiety Past Week at Unknown time Yes Unknown, Entered By History   buPROPion (WELLBUTRIN XL) 150 MG 24 hr tablet Take 150 mg by mouth daily Past Week at Unknown time Yes Unknown, Entered By History   escitalopram (LEXAPRO) 5 MG tablet Take 5 mg by mouth daily Past Week at Unknown time Yes Unknown, Entered By History   ibuprofen (ADVIL/MOTRIN) 200 MG tablet Take 200-400 mg by mouth every 8 hours as needed (headache)  Past Week at Unknown time Yes Unknown, Entered By History   oxybutynin ER (DITROPAN-XL) 10 MG 24 hr tablet Take 10 mg by mouth daily Past Week at Unknown time Yes Unknown, Entered By History   tiZANidine (ZANAFLEX) 4 MG tablet Take 4 mg by mouth nightly as needed for muscle spasms Past Month at Unknown time Yes Unknown, Entered By History   zolpidem (AMBIEN) 10 MG tablet Take 10 mg by mouth nightly as needed for sleep Past Week at Unknown time Yes Reported, Patient       Date completed: 08/28/21    Medication history completed by: Hank Pickens [facility]

## 2021-08-30 ENCOUNTER — DOCUMENTATION ONLY (OUTPATIENT)
Dept: OTHER | Facility: CLINIC | Age: 46
End: 2021-08-30

## 2021-08-30 NOTE — CONSULTS
[facility] Extended Care, Consult & Liaison    Lizz Polanco  August 29, 2021    Session start: 1:30 pm  Session end: 1:45 pm  Session duration in minutes: 15  Patient was seen virtually ([email or web address] cart or other teleconferencing device).    Reason for consult: Psychiatry consult was requested due to needing outpatient resources and safety planning. Patient was seen by [facility] Consult & Liaison team.     Identifying information: Lizz is 46 year old White  female   followed related to overdose. [place] with 3 siblings in an intact family.  She has 2 children in their  teens and she has joint custody of them with her previous .  She has an older son from another relationship whom she is estranged from due to his drug use.  She lives with her partner of about 6 years, and they both work full-time. Patient works from home.    Presenting problem (including symptoms, strengths risks, resources used): Patient thoroughly assessed by Dr. Ordonez (see note 8/28/21). Safety concerns are not present today.     Mental Status Exam   Affect: Appropriate  Appearance: Appropriate   Attention Span/Concentration: Attentive    Eye Contact: Engaged  Fund of Knowledge: Appropriate   Language /Speech Content: Fluent  Language /Speech Volume: Normal   Language /Speech Rate/Productions: Normal   Recent Memory: Intact  Remote Memory: Intact  Mood: Normal   Orientation:   Person: Yes   Place: Yes  Time of Day: Yes   Date: Yes   Situation (Do they understand why they are here?): Yes   Psychomotor Behavior: Normal   Thought Content: Clear  Thought Form: Intact    Relevant history:   Never hospitalized, but she did do a day program about 3 years ago.  He has tried a number of antidepressants over the years.  She had been on Wellbutrin  mg for smoking cessation and her new primary care provider added 5 mg of Lexapro at the time of visit on the 16th.      Therapeutic intervention and progress:  Therapeutic intervention  consisted of building therapeutic rapport, active listening, stress relief practices and discharge planning. Patient is making progress towards treatment goals as evidenced by suicidal thoughts improved.     Collateral information:   Coordinated with Dr. Ordonez who assessed patient, he reports thorough safety assessment and need of safety plan and outpatient resources.     Diagnosis:   296.32 (F33.1) Major Depressive Disorder, Recurrent Episode, Moderate _  300.01 (F41.0) Panic Disorder  300.02 (F41.1) Generalized Anxiety Disorder     Plan:     Patient safe to discharge per Dr. Ordonez and my brief assessment today.     Safety plan and follow up appointments:  Psychiatry (initial visit in person):      Monday 9/20/2021 12:00 pm    Medication Mgmt - Initial (In-Person) Christophe Brown DNP, CNP,RN           [facility], [facility]   [address], [address] [place]  [phone]        Teletherapy  Tomorrow 8/30/2021 2:00 pm  Claire Hawthorne MA  [facility]        [employer], [facility]   [address] [place] [address], [address]  [place], [place]  [phone]                If I am feeling unsafe or I am in a crisis, I will:   Contact my established care providers   Call the National Suicide Prevention Lifeline: 312.693.2911   Go to the nearest emergency room   Call 91      Warning signs that I or other people might notice when a crisis is developing for me: increase depression/anxiety or suicidal thoughts, sleeping too much     Things I am able to do on my own to cope or help me feel better: get ouside     Things that I am able to do with others to cope or help me better: reach out to friends and BF     Things I can use or do for distraction: watch TV, get outside     Changes I can make to support my mental health and wellness: Go into work one day a week, schedule time during week to get outside, create healthy sleeping habits/routine and not sleep as much as I am now.      People in my life that I can ask for help: Best  friend/neighbor Violet boyfriend     Your [facility] has a mental health crisis team you can call 24/7: [facility] Crisis Line Number: [phone]       Mihaela Kirby [facility], [facility] Extended Care, Consult & Liaison Service  [phone]

## 2021-09-26 ENCOUNTER — HEALTH MAINTENANCE LETTER (OUTPATIENT)
Age: 46
End: 2021-09-26

## 2021-12-17 ENCOUNTER — HOSPITAL ENCOUNTER (EMERGENCY)
Facility: CLINIC | Age: 46
Discharge: HOME | End: 2021-12-17
Payer: COMMERCIAL

## 2021-12-17 VITALS
DIASTOLIC BLOOD PRESSURE: 71 MMHG | BODY MASS INDEX: 24.86 KG/M2 | RESPIRATION RATE: 20 BRPM | OXYGEN SATURATION: 97 % | SYSTOLIC BLOOD PRESSURE: 97 MMHG | WEIGHT: 121 LBS | TEMPERATURE: 99.1 F | HEART RATE: 92 BPM

## 2021-12-17 NOTE — ED TRIAGE NOTES
Patient states she is now pain free after walking around the lobby . She is leaving without being seen and will return for any worse symptoms.

## 2021-12-17 NOTE — ED TRIAGE NOTES
Patient c/o right scapula pain s/p hysterectomy this morning at [facility].

## 2021-12-29 ENCOUNTER — HOSPITAL ENCOUNTER (EMERGENCY)
Facility: CLINIC | Age: 46
Discharge: HOME | End: 2021-12-29
Attending: EMERGENCY MEDICINE | Admitting: EMERGENCY MEDICINE
Payer: COMMERCIAL

## 2021-12-29 VITALS
BODY MASS INDEX: 22.6 KG/M2 | WEIGHT: 110 LBS | DIASTOLIC BLOOD PRESSURE: 80 MMHG | SYSTOLIC BLOOD PRESSURE: 106 MMHG | TEMPERATURE: 100.7 F | RESPIRATION RATE: 18 BRPM | HEART RATE: 98 BPM | OXYGEN SATURATION: 96 %

## 2021-12-29 DIAGNOSIS — J10.1 INFLUENZA A: ICD-10-CM

## 2021-12-29 LAB
ALBUMIN SERPL-MCNC: 3.3 G/DL (ref 3.4–5)
ALBUMIN UR-MCNC: 30 MG/DL
ALP SERPL-CCNC: 121 U/L (ref 40–150)
ALT SERPL W P-5'-P-CCNC: 57 U/L (ref 0–50)
ANION GAP SERPL CALCULATED.3IONS-SCNC: 6 MMOL/L (ref 3–14)
APPEARANCE UR: ABNORMAL
AST SERPL W P-5'-P-CCNC: 69 U/L (ref 0–45)
BACTERIA #/AREA URNS HPF: ABNORMAL /HPF
BASOPHILS # BLD AUTO: 0.1 10E3/UL (ref 0–0.2)
BASOPHILS NFR BLD AUTO: 1 %
BILIRUB SERPL-MCNC: 0.4 MG/DL (ref 0.2–1.3)
BILIRUB UR QL STRIP: NEGATIVE
BUN SERPL-MCNC: 10 MG/DL (ref 7–30)
CALCIUM SERPL-MCNC: 8.4 MG/DL (ref 8.5–10.1)
CHLORIDE BLD-SCNC: 104 MMOL/L (ref 94–109)
CO2 SERPL-SCNC: 24 MMOL/L (ref 20–32)
COLOR UR AUTO: YELLOW
CREAT SERPL-MCNC: 0.81 MG/DL (ref 0.52–1.04)
CRP SERPL-MCNC: 66 MG/L (ref 0–8)
EOSINOPHIL # BLD AUTO: 0.1 10E3/UL (ref 0–0.7)
EOSINOPHIL NFR BLD AUTO: 2 %
ERYTHROCYTE [DISTWIDTH] IN BLOOD BY AUTOMATED COUNT: 11.9 % (ref 10–15)
FLUAV RNA SPEC QL NAA+PROBE: POSITIVE
FLUBV RNA RESP QL NAA+PROBE: NEGATIVE
GFR SERPL CREATININE-BSD FRML MDRD: 89 ML/MIN/1.73M2
GLUCOSE BLD-MCNC: 121 MG/DL (ref 70–99)
GLUCOSE UR STRIP-MCNC: NEGATIVE MG/DL
HCT VFR BLD AUTO: 33.9 % (ref 35–47)
HGB BLD-MCNC: 11.6 G/DL (ref 11.7–15.7)
HGB UR QL STRIP: ABNORMAL
HOLD SPECIMEN: NORMAL
HOLD SPECIMEN: NORMAL
HYALINE CASTS: 1 /LPF
IMM GRANULOCYTES # BLD: 0 10E3/UL
IMM GRANULOCYTES NFR BLD: 1 %
KETONES UR STRIP-MCNC: NEGATIVE MG/DL
LACTATE SERPL-SCNC: 0.8 MMOL/L (ref 0.7–2)
LEUKOCYTE ESTERASE UR QL STRIP: NEGATIVE
LYMPHOCYTES # BLD AUTO: 0.3 10E3/UL (ref 0.8–5.3)
LYMPHOCYTES NFR BLD AUTO: 6 %
MCH RBC QN AUTO: 31.1 PG (ref 26.5–33)
MCHC RBC AUTO-ENTMCNC: 34.2 G/DL (ref 31.5–36.5)
MCV RBC AUTO: 91 FL (ref 78–100)
MONOCYTES # BLD AUTO: 0.4 10E3/UL (ref 0–1.3)
MONOCYTES NFR BLD AUTO: 6 %
MUCOUS THREADS #/AREA URNS LPF: PRESENT /LPF
NEUTROPHILS # BLD AUTO: 4.8 10E3/UL (ref 1.6–8.3)
NEUTROPHILS NFR BLD AUTO: 84 %
NITRATE UR QL: NEGATIVE
NRBC # BLD AUTO: 0 10E3/UL
NRBC BLD AUTO-RTO: 0 /100
PH UR STRIP: 5 [PH] (ref 5–7)
PLATELET # BLD AUTO: 225 10E3/UL (ref 150–450)
POTASSIUM BLD-SCNC: 3.2 MMOL/L (ref 3.4–5.3)
PROT SERPL-MCNC: 6.7 G/DL (ref 6.8–8.8)
RBC # BLD AUTO: 3.73 10E6/UL (ref 3.8–5.2)
RBC URINE: 1 /HPF
SARS-COV-2 RNA RESP QL NAA+PROBE: NEGATIVE
SODIUM SERPL-SCNC: 134 MMOL/L (ref 133–144)
SP GR UR STRIP: 1.01 (ref 1–1.03)
SQUAMOUS EPITHELIAL: 5 /HPF
UROBILINOGEN UR STRIP-MCNC: NORMAL MG/DL
WBC # BLD AUTO: 5.7 10E3/UL (ref 4–11)
WBC URINE: 2 /HPF

## 2021-12-29 PROCEDURE — 83605 ASSAY OF LACTIC ACID: CPT | Performed by: EMERGENCY MEDICINE

## 2021-12-29 PROCEDURE — 80053 COMPREHEN METABOLIC PANEL: CPT | Performed by: EMERGENCY MEDICINE

## 2021-12-29 PROCEDURE — 99284 EMERGENCY DEPT VISIT MOD MDM: CPT | Mod: 25 | Performed by: EMERGENCY MEDICINE

## 2021-12-29 PROCEDURE — 99284 EMERGENCY DEPT VISIT MOD MDM: CPT | Performed by: EMERGENCY MEDICINE

## 2021-12-29 PROCEDURE — 96374 THER/PROPH/DIAG INJ IV PUSH: CPT | Performed by: EMERGENCY MEDICINE

## 2021-12-29 PROCEDURE — 258N000003 HC RX IP 258 OP 636: Performed by: EMERGENCY MEDICINE

## 2021-12-29 PROCEDURE — 85018 HEMOGLOBIN: CPT | Performed by: EMERGENCY MEDICINE

## 2021-12-29 PROCEDURE — 87040 BLOOD CULTURE FOR BACTERIA: CPT | Performed by: EMERGENCY MEDICINE

## 2021-12-29 PROCEDURE — 87636 SARSCOV2 & INF A&B AMP PRB: CPT | Performed by: EMERGENCY MEDICINE

## 2021-12-29 PROCEDURE — 86140 C-REACTIVE PROTEIN: CPT | Performed by: EMERGENCY MEDICINE

## 2021-12-29 PROCEDURE — 81001 URINALYSIS AUTO W/SCOPE: CPT | Performed by: EMERGENCY MEDICINE

## 2021-12-29 PROCEDURE — 36415 COLL VENOUS BLD VENIPUNCTURE: CPT | Performed by: EMERGENCY MEDICINE

## 2021-12-29 PROCEDURE — C9803 HOPD COVID-19 SPEC COLLECT: HCPCS | Performed by: EMERGENCY MEDICINE

## 2021-12-29 PROCEDURE — 250N000011 HC RX IP 250 OP 636: Performed by: EMERGENCY MEDICINE

## 2021-12-29 PROCEDURE — 96361 HYDRATE IV INFUSION ADD-ON: CPT | Performed by: EMERGENCY MEDICINE

## 2021-12-29 RX ORDER — KETOROLAC TROMETHAMINE 15 MG/ML
15 INJECTION, SOLUTION INTRAMUSCULAR; INTRAVENOUS ONCE
Status: COMPLETED | OUTPATIENT
Start: 2021-12-29 | End: 2021-12-29

## 2021-12-29 RX ORDER — SODIUM CHLORIDE 9 MG/ML
INJECTION, SOLUTION INTRAVENOUS CONTINUOUS
Status: DISCONTINUED | OUTPATIENT
Start: 2021-12-29 | End: 2021-12-29 | Stop reason: HOSPADM

## 2021-12-29 RX ORDER — OSELTAMIVIR PHOSPHATE 75 MG/1
75 CAPSULE ORAL 2 TIMES DAILY
Qty: 10 CAPSULE | Refills: 0 | Status: SHIPPED | OUTPATIENT
Start: 2021-12-29 | End: 2022-01-03

## 2021-12-29 RX ADMIN — KETOROLAC TROMETHAMINE 15 MG: 15 INJECTION, SOLUTION INTRAMUSCULAR; INTRAVENOUS at 05:31

## 2021-12-29 RX ADMIN — SODIUM CHLORIDE 1000 ML: 9 INJECTION, SOLUTION INTRAVENOUS at 04:45

## 2021-12-29 NOTE — ED PROVIDER NOTES
"  History     Chief Complaint   Patient presents with     Fever     Fatigue     HPI  Lizz Polanco is a 46 year old female who presents to the emergency room with fever, body aches, fatigue, weakness.  Symptoms have been ongoing since yesterday.  Has been running a fever but has not checked her temperature at home.  Admits to chills and hot flashes.  Having generalized body aches, especially in her upper back.  Has tried taking tizanidine to help with the symptoms but it has not done anything.  Also has tried taking ibuprofen, last dose was yesterday.  Feeling very fatigued and weak, says that she can hardly walk around her house.  Has a nonproductive cough.  Denies chest pain, no abdominal pain or vomiting.  Does admit to hysterectomy, performed at [facility] on 12/17/2021.  Says that she was given 10 oxycodone after surgery and \"now they are gone\".  Does not see her surgeon until January 7.  Says that she can hardly walk around her house since she is so weak and fatigued.  Has not noticed any issues with her incisions.    Allergies:  No Known Allergies    Problem List:    Patient Active Problem List    Diagnosis Date Noted     Overdose 08/25/2021     Priority: Medium        Past Medical History:    No past medical history on file.    Past Surgical History:    Past Surgical History:   Procedure Laterality Date     BREAST SURGERY       ENT SURGERY       GYN SURGERY         Family History:    No family history on file.    Social History:  Marital Status:   [2]  Social History     Tobacco Use     Smoking status: Current Every Day Smoker     Types: Cigarettes     Smokeless tobacco: Never Used   Substance Use Topics     Alcohol use: No     Drug use: No        Medications:    buPROPion (WELLBUTRIN XL) 150 MG 24 hr tablet  traZODone (DESYREL) 100 MG tablet  escitalopram (LEXAPRO) 5 MG tablet  ibuprofen (ADVIL/MOTRIN) 200 MG tablet  nicotine (NICODERM CQ) 7 MG/24HR 24 hr patch  oxybutynin ER (DITROPAN-XL) 10 MG 24 hr " tablet          Review of Systems   All other systems reviewed and are negative.      Physical Exam   BP: 103/62  Pulse: (!) 121  Temp: (!) 100.7  F (38.2  C)  Resp: 28  Weight: 49.9 kg (110 lb)  SpO2: 95 %      Physical Exam  Vitals and nursing note reviewed.   Constitutional:       General: She is not in acute distress.     Appearance: She is ill-appearing. She is not diaphoretic.   HENT:      Head: Atraumatic.      Mouth/Throat:      Pharynx: No oropharyngeal exudate.   Eyes:      General: No scleral icterus.     Pupils: Pupils are equal, round, and reactive to light.   Cardiovascular:      Rate and Rhythm: Regular rhythm. Tachycardia present.      Heart sounds: Normal heart sounds.   Pulmonary:      Effort: Pulmonary effort is normal. No respiratory distress.      Breath sounds: Normal breath sounds.   Abdominal:      General: Bowel sounds are normal.      Palpations: Abdomen is soft.      Tenderness: There is no abdominal tenderness.      Comments: Incisions from laparoscopic hysterectomy are intact, no surrounding erythema or evidence of infection   Musculoskeletal:         General: No tenderness.   Skin:     General: Skin is warm.      Findings: No rash.   Neurological:      Mental Status: She is alert.         ED Course     Mental Health Risk Assessment      PSS-3    Date and Time Over the past 2 weeks have you felt down, depressed, or hopeless? Over the past 2 weeks have you had thoughts of killing yourself? Have you ever attempted to kill yourself? When did this last happen? User   12/29/21 0422 no no yes -- JEK                Item Assessment   Suicidal Ideation None   Plan N/A   Intent N/A   Suicidal or self-harm behaviors Previous suicide attempt by overdose, no current thoughts or plans   Risk Factors Previous psychiatric diagnosis and treatments   Protective Factors Identified reasons for living and Supportive social network of family and/or friends              Procedures              Critical Care  time:  none               Results for orders placed or performed during the hospital encounter of 12/29/21   Comprehensive metabolic panel     Status: Abnormal   Result Value Ref Range    Sodium 134 133 - 144 mmol/L    Potassium 3.2 (L) 3.4 - 5.3 mmol/L    Chloride 104 94 - 109 mmol/L    Carbon Dioxide (CO2) 24 20 - 32 mmol/L    Anion Gap 6 3 - 14 mmol/L    Urea Nitrogen 10 7 - 30 mg/dL    Creatinine 0.81 0.52 - 1.04 mg/dL    Calcium 8.4 (L) 8.5 - 10.1 mg/dL    Glucose 121 (H) 70 - 99 mg/dL    Alkaline Phosphatase 121 40 - 150 U/L    AST 69 (H) 0 - 45 U/L    ALT 57 (H) 0 - 50 U/L    Protein Total 6.7 (L) 6.8 - 8.8 g/dL    Albumin 3.3 (L) 3.4 - 5.0 g/dL    Bilirubin Total 0.4 0.2 - 1.3 mg/dL    GFR Estimate 89 >60 mL/min/1.73m2   Lactic acid whole blood     Status: Normal   Result Value Ref Range    Lactic Acid 0.8 0.7 - 2.0 mmol/L   CRP inflammation     Status: Abnormal   Result Value Ref Range    CRP Inflammation 66.0 (H) 0.0 - 8.0 mg/L   UA with Microscopic reflex to Culture     Status: Abnormal    Specimen: Urine, NOS   Result Value Ref Range    Color Urine Yellow Colorless, Straw, Light Yellow, Yellow    Appearance Urine Slightly Cloudy (A) Clear    Glucose Urine Negative Negative mg/dL    Bilirubin Urine Negative Negative    Ketones Urine Negative Negative mg/dL    Specific Gravity Urine 1.014 1.003 - 1.035    Blood Urine Moderate (A) Negative    pH Urine 5.0 5.0 - 7.0    Protein Albumin Urine 30  (A) Negative mg/dL    Urobilinogen Urine Normal Normal, 2.0 mg/dL    Nitrite Urine Negative Negative    Leukocyte Esterase Urine Negative Negative    Bacteria Urine Few (A) None Seen /HPF    Mucus Urine Present (A) None Seen /LPF    RBC Urine 1 <=2 /HPF    WBC Urine 2 <=5 /HPF    Squamous Epithelials Urine 5 (H) <=1 /HPF    Hyaline Casts Urine 1 <=2 /LPF    Narrative    Urine Culture not indicated   Symptomatic; Yes; 12/28/2021 Influenza A/B & SARS-CoV2 (COVID-19) Virus PCR Multiplex Nose     Status: Abnormal     Specimen: Nose; Swab   Result Value Ref Range    Influenza A PCR Positive (A) Negative    Influenza B PCR Negative Negative    SARS CoV2 PCR Negative Negative    Narrative    Testing was performed using the bernabe SARS-CoV-2 & Influenza A/B Assay on the bernabe Sarah System. This test should be ordered for the detection of SARS-CoV-2 and influenza viruses in individuals who meet clinical and/or epidemiological criteria. Test performance is unknown in asymptomatic patients. This test is for in vitro diagnostic use under the FDA EUA for laboratories certified under CLIA to perform moderate and/or high complexity testing. This test has not been FDA cleared or approved. A negative result does not rule out the presence of PCR inhibitors in the specimen or target RNA in concentration below the limit of detection for the assay. If only one viral target is positive but coinfection with multiple targets is suspected, the sample should be re-tested with another FDA cleared, approved or authorized test, if coinfection would change clinical management. [facility] are certified under the Clinical Laboratory Improvement Amendments of 1988 (CLIA-88) as  qualified to perform moderate and/or high complexity laboratory testing.   CBC with platelets and differential     Status: Abnormal   Result Value Ref Range    WBC Count 5.7 4.0 - 11.0 10e3/uL    RBC Count 3.73 (L) 3.80 - 5.20 10e6/uL    Hemoglobin 11.6 (L) 11.7 - 15.7 g/dL    Hematocrit 33.9 (L) 35.0 - 47.0 %    MCV 91 78 - 100 fL    MCH 31.1 26.5 - 33.0 pg    MCHC 34.2 31.5 - 36.5 g/dL    RDW 11.9 10.0 - 15.0 %    Platelet Count 225 150 - 450 10e3/uL    % Neutrophils 84 %    % Lymphocytes 6 %    % Monocytes 6 %    % Eosinophils 2 %    % Basophils 1 %    % Immature Granulocytes 1 %    NRBCs per 100 WBC 0 <1 /100    Absolute Neutrophils 4.8 1.6 - 8.3 10e3/uL    Absolute Lymphocytes 0.3 (L) 0.8 - 5.3 10e3/uL    Absolute Monocytes 0.4 0.0 - 1.3 10e3/uL    Absolute  Eosinophils 0.1 0.0 - 0.7 10e3/uL    Absolute Basophils 0.1 0.0 - 0.2 10e3/uL    Absolute Immature Granulocytes 0.0 <=0.4 10e3/uL    Absolute NRBCs 0.0 10e3/uL   Extra Blue Top Tube     Status: None   Result Value Ref Range    Hold Specimen JIC    Extra Red Top Tube     Status: None   Result Value Ref Range    Hold Specimen JI    Blood Culture Peripheral Blood     Status: Normal (Preliminary result)    Specimen: Peripheral Blood   Result Value Ref Range    Culture No growth after 1 day    CBC with platelets differential     Status: Abnormal    Narrative    The following orders were created for panel order CBC with platelets differential.  Procedure                               Abnormality         Status                     ---------                               -----------         ------                     CBC with platelets and d...[[id number]]  Abnormal            Final result                 Please view results for these tests on the individual orders.   [place] Draw     Status: None    Narrative    The following orders were created for panel order [place] Draw.  Procedure                               Abnormality         Status                     ---------                               -----------         ------                     Extra Blue Top Tube[[id number]]                              Final result               Extra Red Top Tube[[id number]]                               Final result                 Please view results for these tests on the individual orders.       Medications   0.9% sodium chloride BOLUS (has no administration in time range)     Followed by   sodium chloride 0.9% infusion (has no administration in time range)       Assessments & Plan (with Medical Decision Making)  Lizz is a 46 y.o F who presents to the emergency room for fever and fatigue. Recently had hysterectomy. See history and physical exam as above.  Febrile on arrival. Incisions intact, no sign of skin or soft tissue  infection. Will get labs and give fluids, need to rule out post op infection.   Labs reveal influenza A infection. Normal WBC and lactic acid level, not septic. Feeling improved after fluids and toradol in ED. Is up and walking around, anxious to leave. Encouraged close follow up with primary provider and surgeon. Given Rx for Tamiflu. Also discussed supportive care at home. Already off work post op, does not need work note. Discharged in no distress.     I have reviewed the nursing notes.    I have reviewed the findings, diagnosis, plan and need for follow up with the patient.       Discharge Medication List as of 12/29/2021  5:58 AM      START taking these medications    Details   oseltamivir (TAMIFLU) 75 MG capsule Take 1 capsule (75 mg) by mouth 2 times daily for 5 days, Disp-10 capsule, R-0, E-Prescribe             Final diagnoses:   Influenza A       12/29/2021   [facility] EMERGENCY DEPT     Elizabeth Queen,   12/30/21 [id number]

## 2021-12-29 NOTE — DISCHARGE INSTRUCTIONS
Your swab was positive for influenza A.  A prescription for Tamiflu was sent to the pharmacy.  You may start taking this medication, it will be twice daily for 5 days.  Can cause nausea, vomiting, diarrhea    Take Tylenol and ibuprofen per bottle instructions as needed for aches, pains, and fever.  Drink plenty of fluids and get plenty of rest    Your lab work today did not show signs of sepsis or acute infection postoperatively.  Follow-up with your surgeon as scheduled    Return to the emergency room if you have any new or worsening symptoms

## 2021-12-29 NOTE — ED TRIAGE NOTES
Patient has fever and extreme fatigue, feels like she is going to pass out just walking around her house. Recent hysterectomy.

## 2022-01-03 LAB — BACTERIA BLD CULT: NO GROWTH

## 2022-05-08 ENCOUNTER — HEALTH MAINTENANCE LETTER (OUTPATIENT)
Age: 47
End: 2022-05-08

## 2022-07-03 ENCOUNTER — HEALTH MAINTENANCE LETTER (OUTPATIENT)
Age: 47
End: 2022-07-03

## 2023-01-14 ENCOUNTER — HEALTH MAINTENANCE LETTER (OUTPATIENT)
Age: 48
End: 2023-01-14

## 2023-07-16 ENCOUNTER — HEALTH MAINTENANCE LETTER (OUTPATIENT)
Age: 48
End: 2023-07-16

## 2024-02-11 ENCOUNTER — HEALTH MAINTENANCE LETTER (OUTPATIENT)
Age: 49
End: 2024-02-11

## 2024-04-10 ENCOUNTER — HOSPITAL ENCOUNTER (EMERGENCY)
Facility: CLINIC | Age: 49
Discharge: HOME | End: 2024-04-10
Attending: EMERGENCY MEDICINE | Admitting: EMERGENCY MEDICINE
Payer: COMMERCIAL

## 2024-04-10 VITALS
WEIGHT: 97.8 LBS | RESPIRATION RATE: 18 BRPM | OXYGEN SATURATION: 97 % | TEMPERATURE: 98.1 F | SYSTOLIC BLOOD PRESSURE: 97 MMHG | DIASTOLIC BLOOD PRESSURE: 68 MMHG | BODY MASS INDEX: 20.53 KG/M2 | HEART RATE: 68 BPM | HEIGHT: 58 IN

## 2024-04-10 DIAGNOSIS — R93.5 ABNORMAL CT OF THE ABDOMEN: ICD-10-CM

## 2024-04-10 DIAGNOSIS — R63.4 WEIGHT LOSS: ICD-10-CM

## 2024-04-10 DIAGNOSIS — R11.0 NAUSEA: ICD-10-CM

## 2024-04-10 DIAGNOSIS — E86.0 MILD DEHYDRATION: ICD-10-CM

## 2024-04-10 LAB
ADV 40+41 DNA STL QL NAA+NON-PROBE: NEGATIVE
ALBUMIN SERPL BCG-MCNC: 4.5 G/DL (ref 3.5–5.2)
ALP SERPL-CCNC: 61 U/L (ref 40–150)
ALT SERPL W P-5'-P-CCNC: 5 U/L (ref 0–50)
ANION GAP SERPL CALCULATED.3IONS-SCNC: 10 MMOL/L (ref 7–15)
AST SERPL W P-5'-P-CCNC: 17 U/L (ref 0–45)
ASTRO TYP 1-8 RNA STL QL NAA+NON-PROBE: NEGATIVE
BASOPHILS # BLD AUTO: 0.1 10E3/UL (ref 0–0.2)
BASOPHILS NFR BLD AUTO: 1 %
BILIRUB SERPL-MCNC: 0.5 MG/DL
BUN SERPL-MCNC: 11.1 MG/DL (ref 6–20)
C CAYETANENSIS DNA STL QL NAA+NON-PROBE: NEGATIVE
CALCIUM SERPL-MCNC: 9.8 MG/DL (ref 8.6–10)
CAMPYLOBACTER DNA SPEC NAA+PROBE: NEGATIVE
CHLORIDE SERPL-SCNC: 98 MMOL/L (ref 98–107)
CREAT SERPL-MCNC: 0.73 MG/DL (ref 0.51–0.95)
CRYPTOSP DNA STL QL NAA+NON-PROBE: NEGATIVE
DEPRECATED HCO3 PLAS-SCNC: 28 MMOL/L (ref 22–29)
E COLI O157 DNA STL QL NAA+NON-PROBE: NORMAL
E HISTOLYT DNA STL QL NAA+NON-PROBE: NEGATIVE
EAEC ASTA GENE ISLT QL NAA+PROBE: NEGATIVE
EC STX1+STX2 GENES STL QL NAA+NON-PROBE: NEGATIVE
EGFRCR SERPLBLD CKD-EPI 2021: >90 ML/MIN/1.73M2
EOSINOPHIL # BLD AUTO: 0.1 10E3/UL (ref 0–0.7)
EOSINOPHIL NFR BLD AUTO: 1 %
EPEC EAE GENE STL QL NAA+NON-PROBE: NEGATIVE
ERYTHROCYTE [DISTWIDTH] IN BLOOD BY AUTOMATED COUNT: 11.8 % (ref 10–15)
ETEC LTA+ST1A+ST1B TOX ST NAA+NON-PROBE: NEGATIVE
G LAMBLIA DNA STL QL NAA+NON-PROBE: NEGATIVE
GLUCOSE SERPL-MCNC: 110 MG/DL (ref 70–99)
HCT VFR BLD AUTO: 38.6 % (ref 35–47)
HGB BLD-MCNC: 13.3 G/DL (ref 11.7–15.7)
IMM GRANULOCYTES # BLD: 0 10E3/UL
IMM GRANULOCYTES NFR BLD: 0 %
LIPASE SERPL-CCNC: 9 U/L (ref 13–60)
LYMPHOCYTES # BLD AUTO: 1.5 10E3/UL (ref 0.8–5.3)
LYMPHOCYTES NFR BLD AUTO: 16 %
MCH RBC QN AUTO: 30.9 PG (ref 26.5–33)
MCHC RBC AUTO-ENTMCNC: 34.5 G/DL (ref 31.5–36.5)
MCV RBC AUTO: 90 FL (ref 78–100)
MONOCYTES # BLD AUTO: 0.6 10E3/UL (ref 0–1.3)
MONOCYTES NFR BLD AUTO: 6 %
NEUTROPHILS # BLD AUTO: 7.1 10E3/UL (ref 1.6–8.3)
NEUTROPHILS NFR BLD AUTO: 77 %
NOROVIRUS GI+II RNA STL QL NAA+NON-PROBE: NEGATIVE
NRBC # BLD AUTO: 0 10E3/UL
NRBC BLD AUTO-RTO: 0 /100
P SHIGELLOIDES DNA STL QL NAA+NON-PROBE: NEGATIVE
PLATELET # BLD AUTO: 212 10E3/UL (ref 150–450)
POTASSIUM SERPL-SCNC: 3.8 MMOL/L (ref 3.4–5.3)
PROT SERPL-MCNC: 6.8 G/DL (ref 6.4–8.3)
RBC # BLD AUTO: 4.31 10E6/UL (ref 3.8–5.2)
RVA RNA STL QL NAA+NON-PROBE: NEGATIVE
SALMONELLA SP RPOD STL QL NAA+PROBE: NEGATIVE
SAPO I+II+IV+V RNA STL QL NAA+NON-PROBE: NEGATIVE
SHIGELLA SP+EIEC IPAH ST NAA+NON-PROBE: NEGATIVE
SODIUM SERPL-SCNC: 136 MMOL/L (ref 135–145)
V CHOLERAE DNA SPEC QL NAA+PROBE: NEGATIVE
VIBRIO DNA SPEC NAA+PROBE: NEGATIVE
WBC # BLD AUTO: 9.3 10E3/UL (ref 4–11)
Y ENTEROCOL DNA STL QL NAA+PROBE: NEGATIVE

## 2024-04-10 PROCEDURE — 250N000011 HC RX IP 250 OP 636: Performed by: EMERGENCY MEDICINE

## 2024-04-10 PROCEDURE — 258N000003 HC RX IP 258 OP 636: Performed by: EMERGENCY MEDICINE

## 2024-04-10 PROCEDURE — 96374 THER/PROPH/DIAG INJ IV PUSH: CPT | Performed by: EMERGENCY MEDICINE

## 2024-04-10 PROCEDURE — 85004 AUTOMATED DIFF WBC COUNT: CPT | Performed by: EMERGENCY MEDICINE

## 2024-04-10 PROCEDURE — 99284 EMERGENCY DEPT VISIT MOD MDM: CPT | Mod: 25 | Performed by: EMERGENCY MEDICINE

## 2024-04-10 PROCEDURE — 36415 COLL VENOUS BLD VENIPUNCTURE: CPT | Performed by: EMERGENCY MEDICINE

## 2024-04-10 PROCEDURE — 96361 HYDRATE IV INFUSION ADD-ON: CPT | Performed by: EMERGENCY MEDICINE

## 2024-04-10 PROCEDURE — 83690 ASSAY OF LIPASE: CPT | Performed by: EMERGENCY MEDICINE

## 2024-04-10 PROCEDURE — 99284 EMERGENCY DEPT VISIT MOD MDM: CPT | Performed by: EMERGENCY MEDICINE

## 2024-04-10 PROCEDURE — 87507 IADNA-DNA/RNA PROBE TQ 12-25: CPT | Performed by: EMERGENCY MEDICINE

## 2024-04-10 PROCEDURE — 84155 ASSAY OF PROTEIN SERUM: CPT | Performed by: EMERGENCY MEDICINE

## 2024-04-10 RX ORDER — ONDANSETRON 2 MG/ML
4 INJECTION INTRAMUSCULAR; INTRAVENOUS ONCE
Status: COMPLETED | OUTPATIENT
Start: 2024-04-10 | End: 2024-04-10

## 2024-04-10 RX ADMIN — ONDANSETRON 4 MG: 2 INJECTION INTRAMUSCULAR; INTRAVENOUS at 07:55

## 2024-04-10 RX ADMIN — SODIUM CHLORIDE 800 ML: 9 INJECTION, SOLUTION INTRAVENOUS at 07:53

## 2024-04-10 ASSESSMENT — COLUMBIA-SUICIDE SEVERITY RATING SCALE - C-SSRS
1. IN THE PAST MONTH, HAVE YOU WISHED YOU WERE DEAD OR WISHED YOU COULD GO TO SLEEP AND NOT WAKE UP?: NO
6. HAVE YOU EVER DONE ANYTHING, STARTED TO DO ANYTHING, OR PREPARED TO DO ANYTHING TO END YOUR LIFE?: NO
2. HAVE YOU ACTUALLY HAD ANY THOUGHTS OF KILLING YOURSELF IN THE PAST MONTH?: NO

## 2024-04-10 ASSESSMENT — ACTIVITIES OF DAILY LIVING (ADL)
ADLS_ACUITY_SCORE: 38
ADLS_ACUITY_SCORE: 38

## 2024-04-10 NOTE — ED PROVIDER NOTES
History     Chief Complaint   Patient presents with    Nausea & Vomiting     HPI  Lizz Iyer is a 48 year old female who presents with nausea, weight loss, mild intermittent abdominal discomfort.    Over the last 30 to 40 days patient has lost 10-12 pounds  Persistent nausea on a daily basis  Rarely has an emesis of gastric contents.  Mild abdominal discomfort that is intermittent  No reported fever or chills  No night sweats    Was evaluated March 22 by primary care provider with examination report is unremarkable except for the weight loss identified.  Workup included normal CBC, CMP, lipase.  Patient then had a CT scan abdomen and pelvis with contrast enhancement on April 3 which showed concerns for wall thickening in the ascending and sigmoid colon concerning for colitis.  She has had intermittent softer stools but no slime diarrhea.  Last evening did have the need for an enema.  Noted blood in the toilet paper when in the toilet bowl after the enema.  Prior to that she reported no bloody mucousy loose stools.  No previous history for colitis/inflammatory bowel disease.  No travel or questionable food ingestion placing patient at risk for infectious colitis.  No recent antibiotics.  Drinks city water not well water.  Not around farm animals.    No history any liver, pancreas problems.  Has not had any alcohol with symptoms developing in the last 4 to 5 weeks.  Before that she had social alcohol use but nothing that she would consider excessive.  CT did show hepatic steatosis.    Reports no significant change in her dietary intake or caloric intake.      Allergies:  No Known Allergies    Problem List:    Patient Active Problem List    Diagnosis Date Noted    Overdose 08/25/2021     Priority: Medium        Past Medical History:    No past medical history on file.    Past Surgical History:    Past Surgical History:   Procedure Laterality Date    BREAST SURGERY      ENT SURGERY      GYN SURGERY    "      Family History:    No family history on file.    Social History:  Marital Status:   [2]  Social History     Tobacco Use    Smoking status: Every Day     Types: Cigarettes    Smokeless tobacco: Never   Substance Use Topics    Alcohol use: No    Drug use: No        Medications:    buPROPion (WELLBUTRIN XL) 150 MG 24 hr tablet  escitalopram (LEXAPRO) 5 MG tablet  ibuprofen (ADVIL/MOTRIN) 200 MG tablet  nicotine (NICODERM CQ) 7 MG/24HR 24 hr patch  oxybutynin ER (DITROPAN-XL) 10 MG 24 hr tablet  traZODone (DESYREL) 100 MG tablet          Review of Systems   All other systems reviewed and are negative.      Physical Exam   BP: 105/70  Pulse: 101  Temp: 98.1  F (36.7  C)  Resp: 18  Height: 147.3 cm (4' 10\")  Weight: 44.4 kg (97 lb 12.8 oz)  SpO2: 97 %      Physical Exam  Vitals and nursing note reviewed.   Constitutional:       Appearance: She is not ill-appearing.   HENT:      Head: Normocephalic.      Nose: Nose normal.      Mouth/Throat:      Mouth: Mucous membranes are dry.   Eyes:      General: No scleral icterus.     Conjunctiva/sclera: Conjunctivae normal.   Cardiovascular:      Rate and Rhythm: Tachycardia present.      Pulses: Normal pulses.   Pulmonary:      Effort: Pulmonary effort is normal.   Abdominal:      General: Abdomen is flat.      Tenderness: There is no abdominal tenderness. There is no guarding or rebound.      Comments: Tenderness left lower quadrant.   Skin:     General: Skin is warm.      Capillary Refill: Capillary refill takes less than 2 seconds.      Findings: No rash.   Neurological:      General: No focal deficit present.      Mental Status: She is alert and oriented to person, place, and time.   Psychiatric:         Mood and Affect: Mood normal.         Behavior: Behavior normal.         ED Course        Procedures                  Results for orders placed or performed during the hospital encounter of 04/10/24 (from the past 24 hour(s))   CBC with platelets differential    " Narrative    The following orders were created for panel order CBC with platelets differential.  Procedure                               Abnormality         Status                     ---------                               -----------         ------                     CBC with platelets and d...[[id number]]                      Final result                 Please view results for these tests on the individual orders.   Comprehensive metabolic panel   Result Value Ref Range    Sodium 136 135 - 145 mmol/L    Potassium 3.8 3.4 - 5.3 mmol/L    Carbon Dioxide (CO2) 28 22 - 29 mmol/L    Anion Gap 10 7 - 15 mmol/L    Urea Nitrogen 11.1 6.0 - 20.0 mg/dL    Creatinine 0.73 0.51 - 0.95 mg/dL    GFR Estimate >90 >60 mL/min/1.73m2    Calcium 9.8 8.6 - 10.0 mg/dL    Chloride 98 98 - 107 mmol/L    Glucose 110 (H) 70 - 99 mg/dL    Alkaline Phosphatase 61 40 - 150 U/L    AST 17 0 - 45 U/L    ALT 5 0 - 50 U/L    Protein Total 6.8 6.4 - 8.3 g/dL    Albumin 4.5 3.5 - 5.2 g/dL    Bilirubin Total 0.5 <=1.2 mg/dL   Lipase   Result Value Ref Range    Lipase 9 (L) 13 - 60 U/L   CBC with platelets and differential   Result Value Ref Range    WBC Count 9.3 4.0 - 11.0 10e3/uL    RBC Count 4.31 3.80 - 5.20 10e6/uL    Hemoglobin 13.3 11.7 - 15.7 g/dL    Hematocrit 38.6 35.0 - 47.0 %    MCV 90 78 - 100 fL    MCH 30.9 26.5 - 33.0 pg    MCHC 34.5 31.5 - 36.5 g/dL    RDW 11.8 10.0 - 15.0 %    Platelet Count 212 150 - 450 10e3/uL    % Neutrophils 77 %    % Lymphocytes 16 %    % Monocytes 6 %    % Eosinophils 1 %    % Basophils 1 %    % Immature Granulocytes 0 %    NRBCs per 100 WBC 0 <1 /100    Absolute Neutrophils 7.1 1.6 - 8.3 10e3/uL    Absolute Lymphocytes 1.5 0.8 - 5.3 10e3/uL    Absolute Monocytes 0.6 0.0 - 1.3 10e3/uL    Absolute Eosinophils 0.1 0.0 - 0.7 10e3/uL    Absolute Basophils 0.1 0.0 - 0.2 10e3/uL    Absolute Immature Granulocytes 0.0 <=0.4 10e3/uL    Absolute NRBCs 0.0 10e3/uL       Medications   sodium chloride 0.9% BOLUS 800 mL  (has no administration in time range)   ondansetron (ZOFRAN) injection 4 mg (has no administration in time range)       Assessments & Plan (with Medical Decision Making)  Over the last 30 to 40 days patient has lost 10-12 pounds  Persistent nausea on a daily basis  Rarely has an emesis of gastric contents.  Mild abdominal discomfort that is intermittent  No reported fever or chills  No night sweats  Reviewed medical record from her primary care visit  on April 21.  Reviewed the physician's note and the lab results of CBC, CMP, lipase and UA.  I also reviewed the CT scan completed in April 3.  Blood tests were normal.  There was no CRP or sed rate ordered.  CT scan showed hepatic steatosis.  CT also showed inflammation in the ascending and sigmoid colon concerning for possible colitis.  The colon wall was thickened in that area.  On examination today the patient appears fluid volume down.  She is not orthostatic but she is tachycardic at rest with a pulse of over 100 and her blood pressures  are lower than her typical.  Currently BP was 105/70.  She had dry mucous membranes.  Abdomen was unremarkable except she did have some reproducible tenderness palpating left lower quadrant.  Plan: Primary focus today would be IV hydration and some IV Zofran.  Will check CBC, CMP and lipase to make sure that those labs remain normal.  She is scheduled for colonoscopy this Friday.  Already has information for appropriate bowel prep.  Patient has been having loose stool so she does have an urge to have any loose or diarrhea-like stool in the ED today we will collect that for evaluation of possible infectious etiology for her presumed colitis.  9:53 AM  Reviewed labs with patient.  Reassuring.  Will send home with stool collection kit so if she has a loose stool we can test for enteric and viral panel.  Keep her appointment for colonoscopy on Friday.     I have reviewed the nursing notes.    I have reviewed the findings, diagnosis,  plan and need for follow up with the patient.          New Prescriptions    No medications on file       Final diagnoses:   Nausea   Weight loss   Abnormal CT of the abdomen - Inflammation in colon with wall thickening suggestive for colitis   Mild dehydration       4/10/2024   [facility] EMERGENCY DEPT       Bar Childers,   04/10/24 0957

## 2024-04-10 NOTE — DISCHARGE INSTRUCTIONS
All of your blood work was reassuring.  Nutritional values and electrolytes remain normal and unchanged from always when compared to March 21 blood work.  Liver function, pancreas, kidney functions remain normal.  Your white blood cell count remains normal.  Hemoglobin remained stable with a send 13.3.    Recommend proceeding with colonoscopy given findings of your CT suggesting that this is a colitis.  To help distinguish if this is an inflammatory versus infectious colitis I am recommending that you collect stool at home for testing for both a viral panel and foodborne pathogen panel.  Once you have collected the stool please bring that back to the hospital through the ER entrance and we will help process it.  Stool results can take 2 to 3 days to return.  Results are available on [employer] or by contacting your primary clinic provider.  Results were not sent back to ER staff to follow-up with patient's.

## 2024-04-11 ENCOUNTER — LAB (OUTPATIENT)
Dept: LAB | Facility: CLINIC | Age: 49
End: 2024-04-11
Payer: COMMERCIAL

## 2024-04-11 ENCOUNTER — TELEPHONE (OUTPATIENT)
Dept: NURSING | Facility: CLINIC | Age: 49
End: 2024-04-11

## 2024-04-11 DIAGNOSIS — R10.84 ABDOMINAL PAIN, GENERALIZED: Primary | ICD-10-CM

## 2024-04-11 LAB — C DIFF TOX B STL QL: NEGATIVE

## 2024-04-11 PROCEDURE — 87493 C DIFF AMPLIFIED PROBE: CPT

## 2024-04-11 NOTE — TELEPHONE ENCOUNTER
Received a call from the [facility]. Patient had an enteric panel done and a incidental positive C-diff on their results. Patient had nausea and vomiting with unintentional weight loss over the last month.      Symptoms are the same, stool is watery. No blood in stool.      Spoke with Dr. Vail at the [facility] ED. He stated he will call the patient and provide an order for C-diff testing if indicated.     SUZETTE WRIGHT RN

## 2024-04-16 NOTE — ED NOTES
4/16/24 0946 : patient called to ED with questions about her CDiff result and if she can proceed with her recommended colonoscopy. Patient is aware CDiff is negative from 4/11/24 and should follow up with her PMD. She states she will also proceed with scheduling her colonoscopy.

## 2025-05-11 ENCOUNTER — HEALTH MAINTENANCE LETTER (OUTPATIENT)
Age: 50
End: 2025-05-11

## 2025-07-13 ENCOUNTER — HEALTH MAINTENANCE LETTER (OUTPATIENT)
Age: 50
End: 2025-07-13